# Patient Record
Sex: MALE | Race: WHITE | NOT HISPANIC OR LATINO | Employment: FULL TIME | URBAN - METROPOLITAN AREA
[De-identification: names, ages, dates, MRNs, and addresses within clinical notes are randomized per-mention and may not be internally consistent; named-entity substitution may affect disease eponyms.]

---

## 2019-01-21 ENCOUNTER — OFFICE VISIT - RIVER FALLS (OUTPATIENT)
Dept: FAMILY MEDICINE | Facility: CLINIC | Age: 39
End: 2019-01-21

## 2019-01-21 ASSESSMENT — MIFFLIN-ST. JEOR: SCORE: 2188.6

## 2019-01-22 LAB
BUN SERPL-MCNC: 14 MG/DL (ref 7–25)
BUN/CREAT RATIO - HISTORICAL: NORMAL (ref 6–22)
CALCIUM SERPL-MCNC: 9.7 MG/DL (ref 8.6–10.3)
CHLORIDE BLD-SCNC: 105 MMOL/L (ref 98–110)
CO2 SERPL-SCNC: 28 MMOL/L (ref 20–32)
CREAT SERPL-MCNC: 0.85 MG/DL (ref 0.6–1.35)
EGFRCR SERPLBLD CKD-EPI 2021: 111 ML/MIN/1.73M2
ERYTHROCYTE [DISTWIDTH] IN BLOOD BY AUTOMATED COUNT: 12.5 % (ref 11–15)
GLUCOSE BLD-MCNC: 98 MG/DL (ref 65–99)
HCT VFR BLD AUTO: 42.5 % (ref 38.5–50)
HGB BLD-MCNC: 14.7 GM/DL (ref 13.2–17.1)
MCH RBC QN AUTO: 31.1 PG (ref 27–33)
MCHC RBC AUTO-ENTMCNC: 34.6 GM/DL (ref 32–36)
MCV RBC AUTO: 90 FL (ref 80–100)
PLATELET # BLD AUTO: 377 10*3/UL (ref 140–400)
PMV BLD: 10.8 FL (ref 7.5–12.5)
POTASSIUM BLD-SCNC: 4.5 MMOL/L (ref 3.5–5.3)
RBC # BLD AUTO: 4.72 10*6/UL (ref 4.2–5.8)
SODIUM SERPL-SCNC: 141 MMOL/L (ref 135–146)
TSH SERPL DL<=0.005 MIU/L-ACNC: 1.45 MIU/L (ref 0.4–4.5)
WBC # BLD AUTO: 10.6 10*3/UL (ref 3.8–10.8)

## 2019-02-27 ENCOUNTER — OFFICE VISIT - RIVER FALLS (OUTPATIENT)
Dept: FAMILY MEDICINE | Facility: CLINIC | Age: 39
End: 2019-02-27

## 2019-02-27 ASSESSMENT — MIFFLIN-ST. JEOR: SCORE: 2205.83

## 2019-09-25 ENCOUNTER — OFFICE VISIT - RIVER FALLS (OUTPATIENT)
Dept: FAMILY MEDICINE | Facility: CLINIC | Age: 39
End: 2019-09-25

## 2019-09-25 ASSESSMENT — MIFFLIN-ST. JEOR: SCORE: 2274.78

## 2019-10-31 ENCOUNTER — OFFICE VISIT - RIVER FALLS (OUTPATIENT)
Dept: FAMILY MEDICINE | Facility: CLINIC | Age: 39
End: 2019-10-31

## 2019-10-31 ASSESSMENT — MIFFLIN-ST. JEOR: SCORE: 2297.46

## 2020-02-11 ENCOUNTER — AMBULATORY - RIVER FALLS (OUTPATIENT)
Dept: FAMILY MEDICINE | Facility: CLINIC | Age: 40
End: 2020-02-11

## 2020-02-12 LAB
BUN SERPL-MCNC: 18 MG/DL (ref 7–25)
BUN/CREAT RATIO - HISTORICAL: NORMAL (ref 6–22)
CALCIUM SERPL-MCNC: 9.7 MG/DL (ref 8.6–10.3)
CHLORIDE BLD-SCNC: 105 MMOL/L (ref 98–110)
CHOLEST SERPL-MCNC: 194 MG/DL
CHOLEST/HDLC SERPL: 4.7 {RATIO}
CO2 SERPL-SCNC: 25 MMOL/L (ref 20–32)
CREAT SERPL-MCNC: 0.88 MG/DL (ref 0.6–1.35)
EGFRCR SERPLBLD CKD-EPI 2021: 109 ML/MIN/1.73M2
GLUCOSE BLD-MCNC: 92 MG/DL (ref 65–99)
HDLC SERPL-MCNC: 41 MG/DL
LDLC SERPL CALC-MCNC: 133 MG/DL
NONHDLC SERPL-MCNC: 153 MG/DL
POTASSIUM BLD-SCNC: 4.5 MMOL/L (ref 3.5–5.3)
SODIUM SERPL-SCNC: 140 MMOL/L (ref 135–146)
TRIGL SERPL-MCNC: 97 MG/DL

## 2020-02-17 ENCOUNTER — COMMUNICATION - RIVER FALLS (OUTPATIENT)
Dept: FAMILY MEDICINE | Facility: CLINIC | Age: 40
End: 2020-02-17

## 2020-02-18 ENCOUNTER — OFFICE VISIT - RIVER FALLS (OUTPATIENT)
Dept: FAMILY MEDICINE | Facility: CLINIC | Age: 40
End: 2020-02-18

## 2020-02-18 ASSESSMENT — MIFFLIN-ST. JEOR: SCORE: 2319.23

## 2020-03-03 ENCOUNTER — OFFICE VISIT - RIVER FALLS (OUTPATIENT)
Dept: FAMILY MEDICINE | Facility: CLINIC | Age: 40
End: 2020-03-03

## 2021-05-11 ENCOUNTER — OFFICE VISIT - RIVER FALLS (OUTPATIENT)
Dept: FAMILY MEDICINE | Facility: CLINIC | Age: 41
End: 2021-05-11

## 2022-02-11 VITALS
OXYGEN SATURATION: 94 % | TEMPERATURE: 97.7 F | DIASTOLIC BLOOD PRESSURE: 78 MMHG | SYSTOLIC BLOOD PRESSURE: 128 MMHG | HEART RATE: 110 BPM | WEIGHT: 314 LBS

## 2022-02-12 VITALS
HEART RATE: 103 BPM | SYSTOLIC BLOOD PRESSURE: 118 MMHG | HEIGHT: 69 IN | OXYGEN SATURATION: 97 % | BODY MASS INDEX: 42.63 KG/M2 | WEIGHT: 287.8 LBS | DIASTOLIC BLOOD PRESSURE: 80 MMHG

## 2022-02-12 VITALS
WEIGHT: 308 LBS | HEIGHT: 69 IN | RESPIRATION RATE: 16 BRPM | OXYGEN SATURATION: 96 % | BODY MASS INDEX: 44.88 KG/M2 | HEIGHT: 69 IN | DIASTOLIC BLOOD PRESSURE: 80 MMHG | HEART RATE: 74 BPM | DIASTOLIC BLOOD PRESSURE: 78 MMHG | BODY MASS INDEX: 45.62 KG/M2 | WEIGHT: 303 LBS | SYSTOLIC BLOOD PRESSURE: 126 MMHG | HEART RATE: 72 BPM | SYSTOLIC BLOOD PRESSURE: 122 MMHG | TEMPERATURE: 97.8 F

## 2022-02-12 VITALS
SYSTOLIC BLOOD PRESSURE: 124 MMHG | DIASTOLIC BLOOD PRESSURE: 68 MMHG | WEIGHT: 312.8 LBS | HEIGHT: 69 IN | BODY MASS INDEX: 46.33 KG/M2 | HEART RATE: 74 BPM

## 2022-02-12 VITALS
BODY MASS INDEX: 42.06 KG/M2 | DIASTOLIC BLOOD PRESSURE: 72 MMHG | HEART RATE: 74 BPM | WEIGHT: 284 LBS | SYSTOLIC BLOOD PRESSURE: 124 MMHG | HEIGHT: 69 IN

## 2022-02-16 NOTE — NURSING NOTE
Depression Screening Entered On:  5/11/2021 3:03 PM CDT    Performed On:  5/11/2021 3:03 PM CDT by Hoa Gonzales CMA               Depression Screening   Little Interest - Pleasure in Activities :   More than half the days   Feeling Down, Depressed, Hopeless :   Several days   Initial Depression Screen Score :   3 Score   Poor Appetite or Overeating :   More than half the days   Trouble Falling or Staying Asleep :   More than half the days   Feeling Tired or Little Energy :   More than half the days   Feeling Bad About Yourself :   Not at all   Trouble Concentrating :   Several days   Moving or Speaking Slowly :   Not at all   Thoughts Better Off Dead or Hurting Self :   Not at all   Difficulty at Work, Home, Getting Along :   Somewhat difficult   Detailed Depression Screen Score :   7    Total Depression Screen Score :   10    Hoa Gonzales CMA - 5/11/2021 3:03 PM CDT

## 2022-02-16 NOTE — LETTER
(Inserted Image. Unable to display)     November 15, 2019      ELIF BRITO  [address]  [place], [place] [id number]          Dear ELIF,      Thank you for selecting [facility] (previously [facility]) for your healthcare needs.      Our records indicate you are due for the following services:     Fasting Lab Tests ~ Please do not eat or drink anything 10 hours prior to your scheduled appointment time.  (Water and any medications that you may need are allowed unless directed otherwise.)    If you had your labs done at another facility or with Direct Access Lab Testing at [facility], please bring in a copy of the results to your next visit, mail a copy, or drop off a copy of your results to your Healthcare Provider.      To schedule an appointment or if you have further questions, please contact your primary clinic:   [facility]       [phone]   [facility]       [phone]              [facility]     [phone]      Powered by [employer]    Sincerely,    Dominguez Pedro MD
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
## 2022-02-16 NOTE — PROGRESS NOTES
Patient:   ELIF BRITO            MRN: [id number]            FIN: [id number]               Age:   40 years     Sex:  Male     :  1980   Associated Diagnoses:   Depression; Anxiety; Anxiety   Author:   Dominguez Pedro MD      Chief Complaint   2021 1:58 PM CDT    Medication follow up-Sertraline      History of Present Illness   see chief complaint as noted above and confirmed with the patient     2019    38 year old male here today for follow up with Anxiety and Depression, he was initially seen for this on 2019 and was prescribed Sertraline 50mg tablets. Since starting the medication he has noticed his mood has become more stable, he feels more tenzin and happiness, not so much sadness and feeling upset. He has not had any bad side effects from medication, he has tolerated it well, and would like to continue.      2021  40 year old here for refill.   states medications helped.  he still works more than desired but he is happy.   he struggles with leg kicking during night.  he does snore but often wakes up refreshed.   minimal sleepiness during day  he also notes uncomfortable sensation in legs before sleep and better with movement or massage  at night he does snore and stops breathing occasionally         Review of Systems   Neurologic:  Alert and oriented X4.    Psychiatric:  Anxiety, Depression, No bertha, Not suicidal, No hallucinations.       Health Status   Allergies:    Allergic Reactions (Selected)  Severity Not Documented  No known allergies (No reactions were documented)  Ragweed (No reactions were documented)   Medications:  (Selected)   Prescriptions  Prescribed  sertraline 50 mg oral tablet: = 1 tab(s), Oral, daily, # 30 tab(s), 0 Refill(s), Type: Maintenance, Pharmacy: [facility] [address] [facility], Appt due for further refills, 1 tab(s) Oral daily, 69, in, 20 8:16:00 CST, Height Measured, 312.8, lb, 20 8:16:00 CST, Weight Measured,    Medications          *denotes recorded  medication          sertraline 50 mg oral tablet: 1 tab(s), Oral, daily, 30 tab(s), 0 Refill(s).       Problem list:    All Problems  Morbid obesity / 404722370 / Probable  Restless legs syndrome (RLS) / 34788620 / Confirmed  Moderate tobacco use disorder / 442161702 / Confirmed  Allergic rhinitis / 023336233 / Confirmed  Anxiety with depression / 699428790 / Confirmed  Attention deficit disorder of adult with hyperactivity / 3078057137 / Confirmed  Canceled: Depressive disorder  Canceled: Mild depression / 907111433  Canceled: Anxiety / 27691803  Canceled: Mild depression / 069160854  Canceled: Major Depressive Disorder, Single Episode, Unspecified Degree / 296.20  clinical depression      Histories   Past Medical History:    No active or resolved past medical history items have been selected or recorded.   Family History:    No family history items have been selected or recorded.   Procedure history:    foot surgery.  Comments:  4/27/2016 4:04 PM CDT - Marina Rios  arm surgery.  BA - Barium enema (SNOMED CT 818589151).  facial surgery.  dental surgery.   Social History:        Electronic Cigarette/Vaping Assessment            Electronic Cigarette Use: Use, within last 90 days.      Alcohol Assessment: Denies Alcohol Use      Tobacco Assessment: Current            4 or less cigarettes(less than 1/4 pack)/day in last 30 days, Electronic Cigarettes, 2 per day.  13 year(s).               Previous treatment: Nicotine replacement, Self initiated treatment.  Ready to change: Yes.            Former smoker, quit more than 30 days ago      Substance Abuse Assessment: Denies Substance Abuse      Employment and Education Assessment            Employed, Work/School description: .      Exercise and Physical Activity Assessment: Does not exercise        Physical Examination   Vital Signs   5/11/2021 1:58 PM CDT Temperature Tympanic 97.7 DegF  LOW    Peripheral Pulse Rate 110 bpm  HI    Pulse Site  Radial artery    HR Method Electronic    Systolic Blood Pressure 128 mmHg    Diastolic Blood Pressure 78 mmHg    Mean Arterial Pressure 95 mmHg    BP Site Right arm    BP Method Manual    Oxygen Saturation 94 %      Measurements from flowsheet : Measurements   5/11/2021 1:58 PM CDT    Weight Measured - Standard                314 lb     General:  Alert and oriented, No acute distress.    Eye:  Pupils are equal, round and reactive to light, Normal conjunctiva.    HENT:  Oral mucosa is moist.    Neck:  Supple.    Respiratory:  Respirations are non-labored.    Cardiovascular:  Normal rate, Regular rhythm, No edema.    Gastrointestinal:  Non-distended.    Musculoskeletal:  Normal gait.    Integumentary:  Warm, No rash.    Neurologic:  Alert, Oriented.    Psychiatric:  Cooperative, Appropriate mood & affect, Normal judgment.       Review / Management   Results review      Impression and Plan       Diagnosis     Depression (YXS02-BK F32.9).     Anxiety (HWO60-DV F41.9).     Course:  Improving.    Plan:  will wean off sertraline as it may be cause of restless legs  will try welbutrin SR as replacement    should do home sleep test.    Orders     Orders   Pharmacy:  Wellbutrin  mg/12 hours oral tablet, extended release (Prescribe): = 1 tab(s) ( 150 mg ), Oral, bid, # 180 tab(s), 1 Refill(s), Type: Maintenance, Pharmacy: [facility] [id number], 1 tab(s) Oral bid, 69, in, 2/18/2020 8:16 AM CST, Height Measured, 314, lb, 5/11/2021 1:58 PM CDT, Weight Measured.

## 2022-02-16 NOTE — LETTER
(Inserted Image. Unable to display)   January 22, 2019        ELIF BRITO      [address]  [place], [place] [id number]        Dear ELIF,    Thank you for choosing [facility] for your healthcare needs. Below you will find the results of your recent test(s) done at our clinic.     Your tests show normal electrolytes, normal kidney function, normal thyroid and a good hemoglobin.      Result Name Current Result Reference Range   Sodium Level (mmol/L)  141 1/21/2019 135 - 146   Potassium Level (mmol/L)  4.5 1/21/2019 3.5 - 5.3   Chloride Level (mmol/L)  105 1/21/2019 98 - 110   CO2 Level (mmol/L)  28 1/21/2019 20 - 32   Glucose Level (mg/dL)  98 1/21/2019 65 - 99   BUN (mg/dL)  14 1/21/2019 7 - 25   Creatinine Level (mg/dL)  0.85 1/21/2019 0.60 - 1.35   eGFR (mL/min/1.73m2)  111 1/21/2019 > OR = 60 -    Calcium Level (mg/dL)  9.7 1/21/2019 8.6 - 10.3   TSH (mIU/L)  1.45 1/21/2019 0.40 - 4.50   WBC  10.6 1/21/2019 3.8 - 10.8   RBC  4.72 1/21/2019 4.20 - 5.80   Hgb (gm/dL)  14.7 1/21/2019 13.2 - 17.1   Hct (%)  42.5 1/21/2019 38.5 - 50.0   MCV (fL)  90.0 1/21/2019 80.0 - 100.0   MCH (pg)  31.1 1/21/2019 27.0 - 33.0   MCHC (gm/dL)  34.6 1/21/2019 32.0 - 36.0   RDW (%)  12.5 1/21/2019 11.0 - 15.0   Platelet  377 1/21/2019 140 - 400   MPV (fL)  10.8 1/21/2019 7.5 - 12.5       Please contact me or my assistant at [phone] if you have any questions or concerns.     Sincerely,        Dominguez Pedro MD        What do your labs mean?  Below is a glossary of commonly ordered labs:  LDL   Bad Cholesterol   HDL   Good Cholesterol  AST/ALT   Liver Function   Cr/Creatinine   Kidney Function  Microalbumin   Kidney Function  BUN   Kidney Function  PSA   Prostate    TSH   Thyroid Hormone  HgbA1c   Diabetes Test   Hgb (Hemoglobin)   Red Blood Cells
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
## 2022-02-16 NOTE — TELEPHONE ENCOUNTER
Entered by Mayelin Jacob CMA on May 04, 2021 1:33:55 PM CDT  ---------------------  From: Mayelin Jacob CMA   To: [facility] [id number] [facility]    Sent: 5/4/2021 1:33:55 PM CDT  Subject: Medication Management     ** Submitted: **  Order:sertraline (sertraline 50 mg oral tablet)  1 tab(s)  Oral  daily  Qty:  30 tab(s)        Refills:  0          Substitutions Allowed     Route To Pharmacy - [facility] [address] [facility]    Signed by Mayelin Jacob CMA  5/4/2021 6:33:00 PM [facility]    ** Submitted: **  Complete:sertraline (sertraline 50 mg oral tablet)   Signed by Mayelin Jacob CMA  5/4/2021 6:33:00 PM [facility]    ** Not Approved:  **  sertraline (SERTRALINE HCL 50 MG TABLET)  TAKE 1 TABLET BY MOUTH EVERY DAY  Qty:  90 tab(s)        Days Supply:  90        Refills:  3          Substitutions Allowed     Route To Pharmacy - [facility] [address] [facility]   Signed by Mayelin Jacob CMA            ------------------------------------------  From: [facility] [address] [facility]  To: Dominguez Pedro MD  Sent: May 4, 2021 1:23:13 PM CDT  Subject: Medication Management  Due: April 27, 2021 12:41:43 AM CDT     ** On Hold Pending Signature **     Dispensed Drug: sertraline (sertraline 50 mg oral tablet), TAKE 1 TABLET BY MOUTH EVERY DAY  Quantity: 90 tab(s)  Days Supply: 90  Refills: 3  Substitutions Allowed  Notes from Pharmacy:  ---------------------------------------------------------------  From: Mayelin Jacob CMA (eRx Pool ([id number]_[id number] [facility]))   To: Appointment Pool ([id number]_[place]);     Sent: 5/4/2021 1:34:46 PM CDT  Subject: FW: Medication Management   Due Date/Time: 5/5/2021 1:23:00 PM CDT     Please contact pt to schedule med check with [facility]    Rx filled per protocol  2/18/20 obesity  [email or web address] [id number] ON VMscheduled

## 2022-02-16 NOTE — TELEPHONE ENCOUNTER
---------------------  From: Joanna Pierce MA (eRx Pool (32224_[facility]))   To: [facility] Message Pool (32224_[place] [facility] [place]);     Sent: 2/19/2019 3:56:45 PM CST  Subject: FW: Medication Management   Due Date/Time: 2/17/2019 10:07:00 AM CST     Med Refill  PCP: CHARLENE    Medication: sertraline    Last filled:  1/21/19    Quantity:  30    Date of last office visit and reason:  1/21/19 anxiety/depression    RTC order placed: letter sent and message left for patient to make appt          ** Patient matched by Joanna Pierce MA on 2/19/2019 3:52:39 PM CST **      ------------------------------------------  From: [facility] [id number] [facility]  To: Dominguez Pedro MD  Sent: February 16, 2019 10:07:24 AM CST  Subject: Medication Management  Due: February 17, 2019 10:07:24 AM CST    ** On Hold Pending Signature **  Drug: sertraline (sertraline 50 mg oral tablet)  TAKE 1 TABLET BY MOUTH EVERY DAY  Quantity: 30 tab(s)     Days Supply: 30        Refills: 0  Substitutions Allowed  Notes from Pharmacy:     Dispensed Drug: sertraline (sertraline 50 mg oral tablet)  TAKE 1 TABLET BY MOUTH EVERY DAY  Quantity: 30 tab(s)     Days Supply: 30        Refills: 0  Substitutions Allowed  Notes from Pharmacy:   ---------------------------------------------------------------  From: Charley Grigsby MA ([facility] Message Pool (32224_[facility]))   To: Dominguez Pedro MD;     Sent: 2/19/2019 4:41:44 PM CST  Subject: FW: Medication Management   Due Date/Time: 2/17/2019 10:07:00 AM CST---------------------  From: Dominguez Pedro MD   To: [id number]    Sent: 2/19/2019 4:58:26 PM CST  Subject: FW: Medication Management     ** Submitted: **  Complete:sertraline (sertraline 50 mg oral tablet)   Signed by Dominguez Pedro MD  2/19/2019 4:58:00 PM    ** Approved **  sertraline (SERTRALINE HCL 50 MG TABLET)  TAKE 1 TABLET BY MOUTH EVERY DAY  Qty:  30 tab(s)        Days Supply:  30        Refills:  0          Substitutions Allowed     Route To  Pharmacy - [facility] [id number] [facility]

## 2022-02-16 NOTE — TELEPHONE ENCOUNTER
Entered by Zoe Aranda RN on September 18, 2019 8:50:51 AM CDT  ---------------------  From: Zoe Aranda RN   To: [facility] [id number] [facility]    Sent: 9/18/2019 8:50:51 AM CDT  Subject: Medication Management     ** Not Approved: Request responded to by other means **  sertraline (SERTRALINE HCL 50 MG TABLET)  TAKE 1 TABLET BY MOUTH EVERY DAY  Qty:  90 tab(s)        Days Supply:  90        Refills:  1          Substitutions Allowed     Route To [place] [address] [facility]   Signed by Zoe Aranda RN            ** Submitted: **  Order:sertraline (sertraline 50 mg oral tablet)  1 tab(s)  Oral  daily  Qty:  30 tab(s)        Refills:  0          Substitutions Allowed     Route To [place] [address] [facility]    Signed by Zoe Aranda RN  9/18/2019 8:50:00 AM    ** Submitted: **  Complete:sertraline (sertraline 50 mg oral tablet)   Signed by Zoe Aranda RN  9/18/2019 8:50:00 AM    ** Not Approved:  **  sertraline (SERTRALINE HCL 50 MG TABLET)  TAKE 1 TABLET BY MOUTH EVERY DAY  Qty:  90 tab(s)        Days Supply:  90        Refills:  1          Substitutions Allowed     Route To [place] [address] [facility]   Signed by Zoe Aranda RN            ------------------------------------------  From: [place] [address] [facility]  To: Dominguez Pedro MD  Sent: September 16, 2019 1:47:48 AM CDT  Subject: Medication Management  Due: September 17, 2019 1:47:48 AM CDT    ** On Hold Pending Signature **  Drug: sertraline (sertraline 50 mg oral tablet)  TAKE 1 TABLET BY MOUTH EVERY DAY  Quantity: 90 tab(s)     Days Supply: 90        Refills: 1  Substitutions Allowed  Notes from Pharmacy:     Dispensed Drug: sertraline (sertraline 50 mg oral tablet)  TAKE 1 TABLET BY MOUTH EVERY DAY  Quantity: 90 tab(s)     Days Supply: 90        Refills: 1  Substitutions Allowed  Notes from Pharmacy:     ** On Hold Pending Signature **  Drug: sertraline (sertraline 50 mg oral tablet)  TAKE 1 TABLET BY MOUTH EVERY DAY  Quantity: 90 tab(s)     Days Supply: 90         Refills: 1  Substitutions Allowed  Notes from Pharmacy:     Dispensed Drug: sertraline (sertraline 50 mg oral tablet)  TAKE 1 TABLET BY MOUTH EVERY DAY  Quantity: 90 tab(s)     Days Supply: 90        Refills: 1  Substitutions Allowed  Notes from Pharmacy:   ------------------------------------------Date of last office visit and reason:  2-27-19 Depression/ Anxiety w/MJP      Date of last Med Check / Px:   2-27-19  Date of last labs pertaining to med:  _    RTC order in chart:  Due now for medication follow up    For Protocol refill, has patient been contacted:  Sent 30 day supply per protocol. Called patient and left message requesting he call to schedule appointment.

## 2022-02-16 NOTE — TELEPHONE ENCOUNTER
Entered by Mayelin Jacob CMA on October 21, 2019 7:57:18 PM CDT  ---------------------  From: Mayelin Jacob CMA   To: [facility] [address] [facility]    Sent: 10/21/2019 7:57:18 PM CDT  Subject: Medication Management     ** Submitted: **  Order:sertraline (sertraline 50 mg oral tablet)  1 tab(s)  Oral  daily  Qty:  14 tab(s)        Refills:  0          Substitutions Allowed     Route To Pharmacy - [place] [id number] [facility]    Signed by Mayelin Jacob CMA  10/21/2019 7:56:00 PM    ** Submitted: **  Complete:sertraline (sertraline 50 mg oral tablet)   Signed by Mayelin Jacob CMA  10/21/2019 7:57:00 PM    ** Not Approved:  **  sertraline (SERTRALINE HCL 50 MG TABLET)  TAKE 1 TABLET BY MOUTH EVERY DAY  Qty:  30 tab(s)        Days Supply:  30        Refills:  0          Substitutions Allowed     Route To Pharmacy - [id number]   Signed by Mayelin Jacob CMA            ------------------------------------------  From: [facility] [id number] [facility]  To: Dominguez Pedro MD  Sent: October 21, 2019 6:13:12 PM CDT  Subject: Medication Management  Due: October 22, 2019 6:13:12 PM CDT    ** On Hold Pending Signature **  Drug: sertraline (sertraline 50 mg oral tablet)  TAKE 1 TABLET BY MOUTH EVERY DAY  Quantity: 30 tab(s)  Days Supply: 30  Refills: 0  Substitutions Allowed  Notes from Pharmacy:     Dispensed Drug: sertraline (sertraline 50 mg oral tablet)  TAKE 1 TABLET BY MOUTH EVERY DAY  Quantity: 30 tab(s)  Days Supply: 30  Refills: 0  Substitutions Allowed  Notes from Pharmacy:   ------------------------------------------Med Refill      Date of last office visit and reason:  9/25/19; sinus      Date of last Med Check / Px:   2/27/19; depression/anxiety  Date of last labs pertaining to med:  1/21/19    RTC order in chart:  yes; due    For Protocol refill, has patient been contacted:  msg sent to pharmacy

## 2022-02-16 NOTE — NURSING NOTE
Comprehensive Intake Entered On:  5/11/2021 2:02 PM CDT    Performed On:  5/11/2021 1:58 PM CDT by Hoa Gonzales CMA               Summary   Chief Complaint :   Medication follow up-Sertraline   Weight Measured :   314 lb(Converted to: 314 lb 0 oz, 142.428 kg)    Systolic Blood Pressure :   128 mmHg   Diastolic Blood Pressure :   78 mmHg   Mean Arterial Pressure :   95 mmHg   Peripheral Pulse Rate :   110 bpm (HI)    BP Site :   Right arm   Pulse Site :   Radial artery   BP Method :   Manual   HR Method :   Electronic   Temperature Tympanic :   97.7 DegF(Converted to: 36.5 DegC)  (LOW)    Oxygen Saturation :   94 %   Hoa Gonzales CMA - 5/11/2021 1:58 PM CDT   Health Status   Allergies Verified? :   Yes   Medication History Verified? :   Yes   Medical History Verified? :   Yes   Pre-Visit Planning Status :   Completed   Hoa Gonzales CMA - 5/11/2021 1:58 PM CDT   Consents   Consent for Immunization Exchange :   Consent Granted   Consent for Immunizations to Providers :   Consent Granted   Hoa Gonzales CMA - 5/11/2021 1:58 PM CDT   Meds / Allergies   (As Of: 5/11/2021 2:02:51 PM CDT)   Allergies (Active)   No known allergies  Estimated Onset Date:   Unspecified ; Created By:   Generated Domain User for [id number]; Reaction Status:   Active ; Substance:   No known allergies ; Updated By:   Generated Domain User for [id number]; Source:   Patient ; Reviewed Date:   9/25/2019 5:43 PM CDT      Ragweed  Estimated Onset Date:   Unspecified ; Created By:   Generated Domain User for [id number]; Reaction Status:   Active ; Substance:   Ragweed ; Updated By:   [employer] Domain User for [id number]; Reviewed Date:   9/25/2019 5:43 PM CDT        Medication List   (As Of: 5/11/2021 2:02:51 PM CDT)   Prescription/Discharge Order    sertraline  :   sertraline ; Status:   Prescribed ; Ordered As Mnemonic:   sertraline 50 mg oral tablet ; Simple Display Line:   1 tab(s), Oral, daily, 30 tab(s), 0 Refill(s) ; Ordering Provider:   Mayela COBURN,  Dominguez; Catalog Code:   sertraline ; Order Dt/Tm:   5/4/2021 1:33:32 PM CDT            ID Risk Screen   Recent Travel History :   No recent travel   Family Member Travel History :   No recent travel   Other Exposure to Infectious Disease :   Unknown   COVID-19 Testing Status :   No positive COVID-19 test   Hoa Gonzales CMA - 5/11/2021 1:58 PM CDT   Social History   Social History   (As Of: 5/11/2021 2:02:51 PM CDT)   Alcohol:  Denies Alcohol Use      (Last Updated: 4/27/2016 4:05:58 PM CDT by Marina Rios )         Tobacco:  Current      4 or less cigarettes(less than 1/4 pack)/day in last 30 days, Electronic Cigarettes, 2 per day.  13 year(s).  Previous treatment: Nicotine replacement, Self initiated treatment.  Ready to change: Yes.   (Last Updated: 1/21/2019 5:13:20 PM CST by Dede Crowell CMA)   Former smoker, quit more than 30 days ago   (Last Updated: 5/11/2021 2:00:26 PM CDT by Hoa Gonzales CMA)          Electronic Cigarette/Vaping:        Electronic Cigarette Use: Use, within last 90 days.   (Last Updated: 5/11/2021 2:00:16 PM CDT by Hoa Gonzales CMA)          Substance Abuse:  Denies Substance Abuse      (Last Updated: 4/27/2016 4:06:01 PM CDT by Marina Rios )         Employment/School:        Employed, Work/School description: .   (Last Updated: 4/27/2016 4:03:55 PM CDT by Marina Rios)          Exercise:  Does not exercise      (Last Updated: 4/27/2016 4:06:04 PM CDT by Marina Rios )

## 2022-02-16 NOTE — NURSING NOTE
Comprehensive Intake Entered On:  2/27/2019 10:25 AM CST    Performed On:  2/27/2019 10:00 AM CST by Charley Grigsby MA               Summary   Chief Complaint :   Depression/Anxeity follow up, started on Sertraline on 1/21/2019   Weight Measured :   287.8 lb(Converted to: 287 lb 13 oz, 130.54 kg)    Height Measured :   69 in(Converted to: 5 ft 9 in, 175.26 cm)    Body Mass Index :   42.5 kg/m2 (HI)    Body Surface Area :   2.52 m2   Systolic Blood Pressure :   118 mmHg   Diastolic Blood Pressure :   80 mmHg   Mean Arterial Pressure :   93 mmHg   Peripheral Pulse Rate :   103 bpm (HI)    BP Site :   Right arm   Pulse Site :   Radial artery   Oxygen Saturation :   97 %   Charley Grigsby MA - 2/27/2019 10:00 AM CST   Health Status   Allergies Verified? :   Yes   Medication History Verified? :   Yes   Medical History Verified? :   No   Pre-Visit Planning Status :   Completed   Tobacco Use? :   Current some day smoker   Tobacco Cessation Review :   Other: uses vap   Charley Grigsby MA - 2/27/2019 10:00 AM CST   Consents   Consent for Immunization Exchange :   Consent Granted   Consent for Immunizations to Providers :   Consent Granted   Charley Grigsby MA - 2/27/2019 10:00 AM CST   Meds / Allergies   (As Of: 2/27/2019 10:25:17 AM CST)   Allergies (Active)   No known allergies  Estimated Onset Date:   Unspecified ; Created By:   [employer] Domain User for [id number]; Reaction Status:   Active ; Substance:   No known allergies ; Updated By:   Generated Domain User for [id number]; Source:   Patient ; Reviewed Date:   11/28/2012 12:00 AM CST      Ragweed  Estimated Onset Date:   Unspecified ; Created By:   Generated Domain User for [id number]; Reaction Status:   Active ; Substance:   Ragweed ; Updated By:   Generated Domain User for [id number]; Reviewed Date:   11/16/2013 12:00 AM CST        Medication List   (As Of: 2/27/2019 10:25:17 AM CST)   Prescription/Discharge Order    sertraline 50 mg oral tablet  :   sertraline  50 mg oral tablet ; Status:   Prescribed ; Ordered As Mnemonic:   sertraline 50 mg oral tablet ; Simple Display Line:   1 tab(s), Oral, daily, 30 tab(s) ; Ordering Provider:   Dominguez Pedro MD; Catalog Code:   sertraline ; Order Dt/Tm:   2/19/2019 4:58:25 PM

## 2022-02-16 NOTE — LETTER
(Inserted Image. Unable to display)     February 23, 2021      ELIF BRITO  [address]  [place], [place] [id number]          Dear ELIF,      Thank you for selecting [facility] (previously [facility]) for your healthcare needs.    Our records indicate you are due for the following services:     Follow-up office visit / Medication Check.    (FYI   Regarding office visits: In some instances, a video visit or telephone visit may be offered as an option.)      To schedule an appointment or if you have further questions, please contact your clinic at [phone].      Powered by [employer] and [employer]    Sincerely,    Dominguez Pedro MD

## 2022-02-16 NOTE — LETTER
(Inserted Image. Unable to display)   August 28, 2019        ELIF BRITO  [address]  [place], [place] [id number]        Dear ELIF,      Thank you for selecting [facility] (previously [facility]) for your healthcare needs.    Our records indicate you are due for the following services:     Follow-up office visit     To schedule an appointment or if you have further questions, please contact your primary clinic:   [facility]       [phone]   [facility]       [phone]              [facility]     [phone]      Powered by [email or web address] and [employer]    Sincerely,    Dominguez Pedro MD

## 2022-02-16 NOTE — PROGRESS NOTES
Chief Complaint    Needs forms filled out for insurance  History of Present Illness      39-year-old here requesting we do labs so that he can fill out a insurance form at work.  Says he feels well this needs is filled out.  Physical Exam   Vitals & Measurements    HR: 74(Peripheral)  BP: 126/78     HT: 69 in  WT: 308 lb  BMI: 45.48       Alert and oriented      Normal cognition  Assessment/Plan       Encounter for immunization (Z23)         Given the flu shot       Pre-employment examination (Z02.1)         He wants his labs done and wants some done and available to him today but I made him aware that some of the labs he needs will need to be fasting and he is not fasting at this time after discussion he decided to think about other ways to get this done but I did discuss general health maintenance for someone his age  Patient Information     Name:ELIF BRITO      Address:      [address] [place] [id number]     Sex:Male     YOB: 1980     [phone]     Emergency Contact:CHANTELL BRITO     MRN:[id number]     FIN:[id number]     Location:[facility]     Date of Service:10/31/2019      Primary Care Physician:       Dominguez Pedro MD, [phone]      Attending Physician:       Dominguez Pedro MD, [phone]  Problem List/Past Medical History    Ongoing     Allergic rhinitis     Anxiety     Attention deficit disorder of adult with hyperactivity     Mild depression     Moderate tobacco use disorder     Morbid obesity     Restless legs syndrome (RLS)    Historical     No qualifying data  Procedure/Surgical History     arm surgery     BA - Barium enema     dental surgery     facial surgery     foot surgery      Comments: X4.  Medications    Atrovent 42 mcg/inh nasal spray, 2 spray(s), Nasal, qid, PRN, 2 refills    sertraline 50 mg oral tablet, 1 tab(s), Oral, daily  Allergies    No known allergies    Ragweed  Social History    Smoking Status -  10/31/2019     Current every day smoker     Alcohol - Denies Alcohol Use, 04/27/2016     Employment/School      Employed, Work/School description: ., 04/27/2016     Exercise - Does not exercise, 04/27/2016     Substance Abuse - Denies Substance Abuse, 04/27/2016     Tobacco - Current, 04/27/2016      4 or less cigarettes(less than 1/4 pack)/day in last 30 days, Electronic Cigarettes, 2 per day. 13 year(s). Previous treatment: Nicotine replacement, Self initiated treatment. Ready to change: Yes., 01/21/2019  Immunizations      Vaccine Date Status      influenza virus vaccine, inactivated 10/31/2019 Given      influenza virus vaccine, inactivated 01/21/2019 Given      influenza virus vaccine, inactivated 11/16/2013 Recorded      influenza virus vaccine, inactivated 10/24/2012 Recorded      influenza virus vaccine, inactivated 10/01/2012 Recorded      Td 03/01/2011 Recorded      tetanus/diphth/pertuss (Tdap) adult/adol 03/01/2011 Recorded      tetanus/diphth/pertuss (Tdap) adult/adol 03/21/2007 Recorded      MMR (measles/mumps/rubella) 07/25/1991 Recorded

## 2022-02-16 NOTE — PROGRESS NOTES
Patient:   ELIF BRITO            MRN: [id number]            FIN: [id number]               Age:   38 years     Sex:  Male     :  1980   Associated Diagnoses:   Depression; Anxiety   Author:   Dominguez Pedro MD      Chief Complaint   2019 10:00 AM CST   Depression/Anxeity follow up, started on Sertraline on 2019      History of Present Illness   see chief complaint as noted above and confirmed with the patient     38 year old male here today for follow up with Anxiety and Depression, he was initially seen for this on 2019 and was prescribed Sertraline 50mg tablets. Since starting the medication he has noticed his mood has become more stable, he feels more tenzin and happiness, not so much sadness and feeling upset. He has not had any bad side effects from medication, he has tolerated it well, and would like to continue.        Review of Systems   Neurologic:  Alert and oriented X4.    Psychiatric:  Anxiety, Depression, No bertha, Not suicidal, No hallucinations.       Health Status   Allergies:    Allergic Reactions (Selected)  Severity Not Documented  No known allergies (No reactions were documented)  Ragweed (No reactions were documented)   Medications:  (Selected)   Prescriptions  Prescribed  sertraline 50 mg oral tablet: 1 tab(s), Oral, daily, # 30 tab(s), Type: Soft Stop, Pharmacy: [facility] [id number] [facility]   Problem list:    All Problems  Allergic Rhinitis, Cause Unspecified / ICD-9-.9 / Confirmed  Attention Deficit Disorder of Childhood with Hyperactivity / ICD-9-.01 / Confirmed  Depressive disorder / Confirmed  Morbid Obesity / ICD-9-.01 / Probable  Restless Legs Syndrome (RLS) / ICD-9-.94 / Confirmed  Tobacco Use Disorder / ICD-9-.1 / Confirmed  Canceled: Major Depressive Disorder, Single Episode, Unspecified Degree / ICD-9-.20      Histories   Past Medical History:    No active or resolved past medical history items have been selected or recorded.    Family History:    No family history items have been selected or recorded.   Procedure history:    foot surgery.  Comments:  4/27/2016 4:04 PM CDT - Marina Rios  arm surgery.  BA - Barium enema ([id number]).  facial surgery.  dental surgery.   Social History:        Alcohol Assessment: Denies Alcohol Use      Tobacco Assessment: Current            4 or less cigarettes(less than 1/4 pack)/day in last 30 days, Electronic Cigarettes, 2 per day.  13 year(s).               Previous treatment: Nicotine replacement, Self initiated treatment.  Ready to change: Yes.      Substance Abuse Assessment: Denies Substance Abuse      Employment and Education Assessment            Employed, Work/School description: .      Exercise and Physical Activity Assessment: Does not exercise      Physical Examination   Vital Signs   2/27/2019 10:00 AM CST Peripheral Pulse Rate 103 bpm  HI    Pulse Site Radial artery    Systolic Blood Pressure 118 mmHg    Diastolic Blood Pressure 80 mmHg    Mean Arterial Pressure 93 mmHg    BP Site Right arm    Oxygen Saturation 97 %      Measurements from flowsheet : Measurements   2/27/2019 10:00 AM CST Height Measured - Standard 69 in    Weight Measured - Standard 287.8 lb    BSA 2.52 m2    Body Mass Index 42.5 kg/m2  HI      General:  Alert and oriented, No acute distress.    Eye:  Pupils are equal, round and reactive to light, Normal conjunctiva.    HENT:  Oral mucosa is moist.    Neck:  Supple.    Respiratory:  Respirations are non-labored.    Cardiovascular:  Normal rate, Regular rhythm, No edema.    Gastrointestinal:  Non-distended.    Musculoskeletal:  Normal gait.    Integumentary:  Warm, No rash.    Neurologic:  Alert, Oriented.    Psychiatric:  Cooperative, Appropriate mood & affect, Normal judgment.       Review / Management   Results review:  Lab results   1/21/2019 5:59 PM CST Sodium Level 141 mmol/L    Potassium Level 4.5 mmol/L    Chloride Level 105 mmol/L    CO2 Level  28 mmol/L    Glucose Level 98 mg/dL    BUN 14 mg/dL    Creatinine Level 0.85 mg/dL    BUN/Creat Ratio NOT APPLICABLE    eGFR 111 mL/min/1.73m2    eGFR African American 129 mL/min/1.73m2    Calcium Level 9.7 mg/dL    TSH 1.45 mIU/L    WBC 10.6    RBC 4.72    Hgb 14.7 gm/dL    Hct 42.5 %    MCV 90.0 fL    MCH 31.1 pg    MCHC 34.6 gm/dL    RDW 12.5 %    Platelet 377    MPV 10.8 fL   .       Impression and Plan       Diagnosis     Depression (VDS35-OL F32.9).     Anxiety (XSA24-XO F41.9).     Course:  Improving.    Plan:  He has tolerated medication well, he will continue it at this time.     Encouraged healthy eating habits and a good exercise routine. Discussed importance of sweating to help mood.     Reviewed lab results with patient, they all look great, no abnormalities seen.     Medication refilled for the next six months.     Return in six months for next appointment. .    [employer] Medical Assistant acted solely as a scribe for, and in presence of Dr. Dominguez Pedro who performed the services.

## 2022-02-16 NOTE — NURSING NOTE
Depression Screening Entered On:  2/27/2019 4:52 PM CST    Performed On:  2/27/2019 4:52 PM CST by Charley Grigsby MA               Depression Screening   Feeling Down, Depressed, Hopeless :   Not at all   Little Interest - Pleasure in Activities :   Several days   Initial Depression Screen Score :   1    Trouble Falling or Staying Asleep :   Several days   Feeling Tired or Little Energy :   More than half the days   Poor Appetite or Overeating :   Not at all   Feeling Bad About Yourself :   Not at all   Trouble Concentrating :   Several days   Moving or Speaking Slowly :   Not at all   Thoughts Better Off Dead or Hurting Self :   Not at all   Detailed Depression Screen Score :   4    Total Depression Screen Score :   5    MICHELLE Difficulty with Work, Home, Others :   Not difficult at all   Charley Grigsby MA - 2/27/2019 4:52 PM CST

## 2022-02-16 NOTE — NURSING NOTE
Comprehensive Intake Entered On:  10/31/2019 11:35 AM CDT    Performed On:  10/31/2019 11:32 AM CDT by Dede Crowell CMA               Summary   Chief Complaint :   Needs forms filled out for insurance   Weight Measured :   308 lb(Converted to: 308 lb 0 oz, 139.71 kg)    Height Measured :   69 in(Converted to: 5 ft 9 in, 175.26 cm)    Body Mass Index :   45.48 kg/m2 (HI)    Body Surface Area :   2.61 m2   Systolic Blood Pressure :   126 mmHg   Diastolic Blood Pressure :   78 mmHg   Mean Arterial Pressure :   94 mmHg   Peripheral Pulse Rate :   74 bpm   Dede Crowell CMA - 10/31/2019 11:32 AM CDT   Health Status   Allergies Verified? :   Yes   Medication History Verified? :   Yes   Pre-Visit Planning Status :   Completed   Tobacco Use? :   Current every day smoker   Dede Crowell CMA - 10/31/2019 11:32 AM CDT   Consents   Consent for Immunization Exchange :   Consent Granted   Consent for Immunizations to Providers :   Consent Granted   Dede Crowell CMA - 10/31/2019 11:32 AM CDT   Meds / Allergies   (As Of: 10/31/2019 11:35:00 AM CDT)   Allergies (Active)   No known allergies  Estimated Onset Date:   Unspecified ; Created By:   enavu Domain User for [id number]; Reaction Status:   Active ; Substance:   No known allergies ; Updated By:   enavu Domain User for [id number]; Source:   Patient ; Reviewed Date:   9/25/2019 5:43 PM CDT      Ragweed  Estimated Onset Date:   Unspecified ; Created By:   enavu Domain User for [id number]; Reaction Status:   Active ; Substance:   Ragweed ; Updated By:   enavu Domain User for [id number]; Reviewed Date:   9/25/2019 5:43 PM CDT        Medication List   (As Of: 10/31/2019 11:35:00 AM CDT)   Prescription/Discharge Order    ipratropium nasal  :   ipratropium nasal ; Status:   Prescribed ; Ordered As Mnemonic:   Atrovent 42 mcg/inh nasal spray ; Simple Display Line:   2 spray(s), nasal, qid, PRN: for nasal congestion, 1 EA, 2 Refill(s) ; Ordering Provider:    Chad PAREDES, Cr BARBER; Catalog Code:   ipratropium nasal ; Order Dt/Tm:   9/25/2019 5:56:27 PM CDT          sertraline  :   sertraline ; Status:   Prescribed ; Ordered As Mnemonic:   sertraline 50 mg oral tablet ; Simple Display Line:   1 tab(s), Oral, daily, 14 tab(s), 0 Refill(s) ; Ordering Provider:   Dominguez Pedro MD; Catalog Code:   sertraline ; Order Dt/Tm:   10/21/2019 7:56:50 PM CDT

## 2022-02-16 NOTE — TELEPHONE ENCOUNTER
Entered by Mayelin Jacob CMA on May 28, 2021 1:56:22 PM CDT  ---------------------  From: Mayelin Jacob CMA   To: [facility] [id number] [facility]    Sent: 5/28/2021 1:56:22 PM CDT  Subject: Medication Management     ** Not Approved: Refill not appropriate **  sertraline (SERTRALINE HCL 50 MG TABLET)  TAKE 1 TABLET BY MOUTH EVERY DAY  Qty:  30 tab(s)        Days Supply:  30        Refills:  0          Substitutions Allowed     Route To Pharmacy - [place] [id number] [facility]   Signed by Mayelin Jacob CMA            ------------------------------------------  From: [facility] [id number] [facility]  To: Dominguez Pedro MD  Sent: May 28, 2021 7:32:57 AM CDT  Subject: Medication Management  Due: May 14, 2021 8:11:22 PM CDT     ** On Hold Pending Signature **     Dispensed Drug: sertraline (sertraline 50 mg oral tablet), TAKE 1 TABLET BY MOUTH EVERY DAY  Quantity: 30 tab(s)  Days Supply: 30  Refills: 0  Substitutions Allowed  Notes from Pharmacy:  ------------------------------------------5/11/21 visit: Plan:  will wean off sertraline as it may be cause of restless legs

## 2022-02-16 NOTE — LETTER
(Inserted Image. Unable to display)   February 17, 2020        ELIF BRITO      [address]  [place], [place] [id number]        Dear ELIF,    Thank you for choosing [facility] for your healthcare needs. Below you will find the results of your recent test(s) done at our clinic.      Your tests have stable and OK results.  Your bad cholesterol of 133 is stable and OK.   LDL less than 100 is great,  less than 130 good,  more than 180 is very bad.      Result Name Current Result Previous Result Reference Range   Sodium Level (mmol/L)  140 2/11/2020  141 1/21/2019 135 - 146   Potassium Level (mmol/L)  4.5 2/11/2020  4.5 1/21/2019 3.5 - 5.3   Chloride Level (mmol/L)  105 2/11/2020  105 1/21/2019 98 - 110   CO2 Level (mmol/L)  25 2/11/2020  28 1/21/2019 20 - 32   Glucose Level (mg/dL)  92 2/11/2020  98 1/21/2019 65 - 99   BUN (mg/dL)  18 2/11/2020  14 1/21/2019 7 - 25   Creatinine Level (mg/dL)  0.88 2/11/2020  0.85 1/21/2019 0.60 - 1.35   eGFR (mL/min/1.73m2)  109 2/11/2020  111 1/21/2019 > OR = 60 -    Calcium Level (mg/dL)  9.7 2/11/2020  9.7 1/21/2019 8.6 - 10.3   Cholesterol (mg/dL)  194 2/11/2020   - <200   Non-HDL Cholesterol ((H)) 153 2/11/2020   - <130   HDL (mg/dL)  41 2/11/2020  > OR = 40 -    Cholesterol/HDL Ratio  4.7 2/11/2020   - <5.0   LDL ((H)) 133 2/11/2020     Triglyceride (mg/dL)  97 2/11/2020   - <150       Please contact me or my assistant at [phone] if you have any questions or concerns.     Sincerely,        Dominguez Pedro MD        What do your labs mean?  Below is a glossary of commonly ordered labs:  LDL   Bad Cholesterol   HDL   Good Cholesterol  AST/ALT   Liver Function   Cr/Creatinine   Kidney Function  Microalbumin   Kidney Function  BUN   Kidney Function  PSA   Prostate    TSH   Thyroid Hormone  HgbA1c   Diabetes Test   Hgb (Hemoglobin)   Red Blood Cells

## 2022-02-16 NOTE — LETTER
(Inserted Image. Unable to display)   August 27, 2021    ELIF BRITO  [address]  [place] [address]            Dear ELIF,      Thank you for selecting [facility] for your healthcare needs.    Our records indicate you are due for the following services:     Follow-up office visit / Medication Check.    (FYI   Regarding office visits: In some instances, a video visit or telephone visit may be offered as an option.)      To schedule an appointment or if you have further questions, please contact your clinic at [phone].      Powered by [employer]    Sincerely,    Dominguez Pedro MD

## 2022-02-16 NOTE — TELEPHONE ENCOUNTER
---------------------  From: Marysol Davidson   To: Neela Whitley;     Cc: Dominguez Pedro MD;      Sent: 3/3/2020 9:43:55 AM CST  Subject: Scheduling Management     Patient no showed appointment today.  Appointment was for a consult per CHARLENE.---------------------  From: Neela Whitley   To: Marysol Davidson;     Sent: 3/3/2020 10:27:36 AM CST  Subject: RE: Scheduling Management     noted, thank you

## 2022-02-16 NOTE — NURSING NOTE
Comprehensive Intake Entered On:  9/25/2019 5:45 PM CDT    Performed On:  9/25/2019 5:39 PM CDT by Jaskaran Vazquez CMA               Summary   Chief Complaint :   Pt here for productive cough,fever, sinus pressure and painful upper teeth x 4 days   Weight Measured :   303 lb(Converted to: 303 lb 0 oz, 137.44 kg)    Height Measured :   69 in(Converted to: 5 ft 9 in, 175.26 cm)    Body Mass Index :   44.74 kg/m2 (HI)    Body Surface Area :   2.58 m2   Systolic Blood Pressure :   122 mmHg   Diastolic Blood Pressure :   80 mmHg   Mean Arterial Pressure :   94 mmHg   Peripheral Pulse Rate :   72 bpm   BP Site :   Right arm   Pulse Site :   Radial artery   Temperature Tympanic :   97.8 DegF(Converted to: 36.6 DegC)  (LOW)    Respiratory Rate :   16 br/min   Oxygen Saturation :   96 %   Jaskaran Vazquez CMA - 9/25/2019 5:39 PM CDT   Health Status   Allergies Verified? :   Yes   Medication History Verified? :   No   Medical History Verified? :   Yes   Pre-Visit Planning Status :   Not completed   Tobacco Use? :   Current every day smoker   Jaskaran Vazquez CMA - 9/25/2019 5:39 PM CDT   Meds / Allergies   (As Of: 9/25/2019 5:46:00 PM CDT)   Allergies (Active)   No known allergies  Estimated Onset Date:   Unspecified ; Created By:   [employer] Domain User for [id number]; Reaction Status:   Active ; Substance:   No known allergies ; Updated By:   Generated Domain User for [id number]; Source:   Patient ; Reviewed Date:   9/25/2019 5:43 PM CDT      Ragweed  Estimated Onset Date:   Unspecified ; Created By:   Generated Domain User for [id number]; Reaction Status:   Active ; Substance:   Ragweed ; Updated By:   [employer] Domain User for [id number]; Reviewed Date:   9/25/2019 5:43 PM CDT        Medication List   (As Of: 9/25/2019 5:46:00 PM CDT)   Prescription/Discharge Order    sertraline  :   sertraline ; Status:   Prescribed ; Ordered As Mnemonic:   sertraline 50 mg oral tablet ; Simple Display Line:   1 tab(s), Oral, daily, 30 tab(s), 0 Refill(s) ;  Ordering Provider:   Dominguez Pedro MD; Catalog Code:   sertraline ; Order Dt/Tm:   9/18/2019 8:50:24 AM            Social History   Social History   (As Of: 9/25/2019 5:46:00 PM CDT)   Alcohol:  Denies Alcohol Use      (Last Updated: 4/27/2016 4:05:58 PM CDT by Marina Rios )         Tobacco:  Current      4 or less cigarettes(less than 1/4 pack)/day in last 30 days, Electronic Cigarettes, 2 per day.  13 year(s).  Previous treatment: Nicotine replacement, Self initiated treatment.  Ready to change: Yes.   (Last Updated: 1/21/2019 5:13:20 PM CST by Dede Crowell CMA)          Substance Abuse:  Denies Substance Abuse      (Last Updated: 4/27/2016 4:06:01 PM CDT by Marina Rios )         Employment/School:        Employed, Work/School description: .   (Last Updated: 4/27/2016 4:03:55 PM CDT by Marina Rios)          Exercise:  Does not exercise      (Last Updated: 4/27/2016 4:06:04 PM CDT by Marina Rios )

## 2022-02-16 NOTE — PROGRESS NOTES
Patient:   ELIF BRITO            MRN: [id number]            FIN: [id number]               Age:   38 years     Sex:  Male     :  1980   Associated Diagnoses:   ADHD (attention deficit hyperactivity disorder); Dysthymia; Morbid obesity   Author:   Dominguez Pedro MD      Chief Complaint   2019 5:05 PM CST    Would like to discuss going back on antidepressants      History of Present Illness   see chief complaint as noted above and confirmed with the patient   38 year old patient presents today to discuss symptoms of depression. He has a history of depression and has been on Sertraline in the past with good results. He stopped taking it in  when he couldn't afford it and struggled to remember to take it because he was traveling for work. He is now working in a stable environment and finds he's struggling again. He is struggling to focus on things and lack of motivation on things he normally enjoys      Review of Systems   Constitutional:  No fever, No chills, No fatigue.    Ear/Nose/Mouth/Throat:  No sore throat.    Respiratory:  No shortness of breath, No cough.    Cardiovascular:  No chest pain.    Gastrointestinal:  No nausea, No vomiting.    Musculoskeletal:  No back pain.    Integumentary:  No rash.    Neurologic:  No headache.    Psychiatric:  Depression, Not suicidal, Not delusional.              Health Status   Allergies:    Allergic Reactions (Selected)  Severity Not Documented  No known allergies (No reactions were documented)  Ragweed (No reactions were documented)   Medications:  (Selected)   Prescriptions  Prescribed  sertraline 50 mg oral tablet: 1 tab(s) ( 50 mg ), po, daily, # 90 tab(s), 3 Refill(s), Type: Maintenance, [id number], 1 tab(s) po daily   Problem list:    All Problems  Tobacco Use Disorder / ICD-9-.1 / Confirmed  Restless Legs Syndrome (RLS) / ICD-9-.94 / Confirmed  Morbid Obesity / ICD-9-.01 / Probable  Depressive disorder /  Confirmed  Attention Deficit Disorder of Childhood with Hyperactivity / ICD-9-.01 / Confirmed  Allergic Rhinitis, Cause Unspecified / ICD-9-.9 / Confirmed  Canceled: Major Depressive Disorder, Single Episode, Unspecified Degree / ICD-9-.20      Histories   Past Medical History:    No active or resolved past medical history items have been selected or recorded.   Family History:    No family history items have been selected or recorded.   Procedure history:    foot surgery.  Comments:  4/27/2016 4:04 PM CDT - Marina Rios  arm surgery.  BA - Barium enema ([id number]).  facial surgery.  dental surgery.   Social History:        Alcohol Assessment: Denies Alcohol Use      Tobacco Assessment: Current            4 or less cigarettes(less than 1/4 pack)/day in last 30 days, Electronic Cigarettes, 2 per day.  13 year(s).               Previous treatment: Nicotine replacement, Self initiated treatment.  Ready to change: Yes.      Substance Abuse Assessment: Denies Substance Abuse      Employment and Education Assessment            Employed, Work/School description: .      Exercise and Physical Activity Assessment: Does not exercise      Physical Examination   Vital Signs   1/21/2019 5:05 PM CST Peripheral Pulse Rate 74 bpm    Systolic Blood Pressure 124 mmHg    Diastolic Blood Pressure 72 mmHg    Mean Arterial Pressure 89 mmHg      Measurements from flowsheet : Measurements   1/21/2019 5:05 PM CST Height Measured - Standard 69 in    Weight Measured - Standard 284 lb    BSA 2.5 m2    Body Mass Index 41.93 kg/m2  HI      General:  Alert and oriented, No acute distress.    Eye:  Pupils are equal, round and reactive to light, Normal conjunctiva.    HENT:  Oral mucosa is moist.    Neck:  Supple.    Respiratory:  Respirations are non-labored.    Cardiovascular:  Normal rate, Regular rhythm, No edema.    Gastrointestinal:  Non-distended.    Musculoskeletal:  Normal gait.    Integumentary:   Warm, No rash.    Psychiatric:  Cooperative, Non-suicidal, Patient's PHQ-9 and CAGE questionnaire reviewed and discussed with patient. .         Mood and affect: Depressed.         Judgment: Able to make sensible decisions.         Thought process: Appropriate.       Review / Management   Results review      Impression and Plan   Diagnosis     ADHD (attention deficit hyperactivity disorder) (NLE37-NK F90.9).     Dysthymia (AOF74-XD F34.1).     Morbid obesity (LCL03-LP E66.01).     Plan:  Discussed use/risk/ benefit of antidepressant medication for depression/anxiety.  Reviewed importance of activity, sleep and social support.  Talked about medication side effects including but not limited to nausea, insomnia or somnolence, headache, dry mouth, sexual dysfunction and worsening emotional symptoms including suicidal thoughts.  Medications will take 3-4 weeks to take effect. Will send him to lab for chem 8, cbc, TSH. Will have him return for lipid panel. Will restart him on Sertaline. updated flu shot    Instructed patient to return to clinic in 1 month.   Patient agrees to be seen immediately by provider or go to emergency department if feeling thoughts of self harm.    Referral placed:  _.    I, Nichelle Crowell [facility], acted solely as a scribe for, and in the presence of Dr. Dominguez Pedro who performed the service.      Professional Services   Counseling Summary:  This was a 30 minute visit with greater than 50% of that time spent counseling the patient.    Counseling included treatment options, risks and benefits, lifestyle changes, and medications.    The patient was attentive, and verbalized understanding.

## 2022-02-16 NOTE — NURSING NOTE
Comprehensive Intake Entered On:  2/18/2020 8:20 AM CST    Performed On:  2/18/2020 8:16 AM CST by Dede Crowell CMA               Summary   Chief Complaint :   1. Follow up lab work. 2. Needs refill of sertaline 3. loosing weight   Weight Measured :   312.8 lb(Converted to: 312 lb 13 oz, 141.88 kg)    Height Measured :   69 in(Converted to: 5 ft 9 in, 175.26 cm)    Body Mass Index :   46.19 kg/m2 (HI)    Body Surface Area :   2.63 m2   Systolic Blood Pressure :   124 mmHg   Diastolic Blood Pressure :   68 mmHg   Mean Arterial Pressure :   87 mmHg   Peripheral Pulse Rate :   74 bpm   Dede Crowell CMA - 2/18/2020 8:16 AM CST   Health Status   Allergies Verified? :   Yes   Medication History Verified? :   Yes   Pre-Visit Planning Status :   Completed   Tobacco Use? :   Current every day smoker   Dede Crowell CMA - 2/18/2020 8:16 AM CST   Consents   Consent for Immunization Exchange :   Consent Granted   Consent for Immunizations to Providers :   Consent Granted   Dede Crowell CMA - 2/18/2020 8:16 AM CST   Meds / Allergies   (As Of: 2/18/2020 8:20:55 AM CST)   Allergies (Active)   No known allergies  Estimated Onset Date:   Unspecified ; Created By:   [employer] Domain User for [id number]; Reaction Status:   Active ; Substance:   No known allergies ; Updated By:   Fan[employer] Domain User for [id number]; Source:   Patient ; Reviewed Date:   9/25/2019 5:43 PM CDT      Ragweed  Estimated Onset Date:   Unspecified ; Created By:   Fan[employer] Domain User for [id number]; Reaction Status:   Active ; Substance:   Ragweed ; Updated By:   [employer] Domain User for [id number]; Reviewed Date:   9/25/2019 5:43 PM CDT        Medication List   (As Of: 2/18/2020 8:20:55 AM CST)   Prescription/Discharge Order    ipratropium nasal  :   ipratropium nasal ; Status:   Prescribed ; Ordered As Mnemonic:   Atrovent 42 mcg/inh nasal spray ; Simple Display Line:   2 spray(s), nasal, qid, PRN: for nasal congestion, 1 EA, 2 Refill(s) ;  Ordering Provider:   Cr Bonilla PA-C; Catalog Code:   ipratropium nasal ; Order Dt/Tm:   9/25/2019 5:56:27 PM CDT          sertraline  :   sertraline ; Status:   Prescribed ; Ordered As Mnemonic:   sertraline 50 mg oral tablet ; Simple Display Line:   1 tab(s), Oral, daily, 14 tab(s), 0 Refill(s) ; Ordering Provider:   Dominguez Pedro MD; Catalog Code:   sertraline ; Order Dt/Tm:   10/21/2019 7:56:50 PM CDT

## 2022-02-16 NOTE — NURSING NOTE
CAGE Assessment Entered On:  2/27/2019 4:52 PM CST    Performed On:  2/27/2019 4:51 PM CST by Charley Grigsby MA               Assessment   Have you ever felt you should cut down on your drinking :   No   Have people annoyed you by criticizing your drinking :   No   Have you ever felt bad or guilty about your drinking :   No   Have you ever taken a drink first thing in the morning to steady your nerves or get rid of a hangover (Eye-opener) :   No   CAGE Score :   0    Charley Grigsby MA - 2/27/2019 4:51 PM CST

## 2022-02-16 NOTE — NURSING NOTE
Comprehensive Intake Entered On:  1/21/2019 5:10 PM CST    Performed On:  1/21/2019 5:05 PM CST by Dede Crowell CMA               Summary   Chief Complaint :   Would like to discuss going back on antidepressants   Weight Measured :   284 lb(Converted to: 284 lb 0 oz, 128.82 kg)    Height Measured :   69 in(Converted to: 5 ft 9 in, 175.26 cm)    Body Mass Index :   41.93 kg/m2 (HI)    Body Surface Area :   2.5 m2   Systolic Blood Pressure :   124 mmHg   Diastolic Blood Pressure :   72 mmHg   Mean Arterial Pressure :   89 mmHg   Peripheral Pulse Rate :   74 bpm   Dede Crowell CMA - 1/21/2019 5:05 PM CST   Health Status   Allergies Verified? :   Yes   Medication History Verified? :   Yes   Pre-Visit Planning Status :   Completed   Tobacco Use? :   Light tobacco smoker   Dede Crowell CMA - 1/21/2019 5:05 PM CST   Consents   Consent for Immunization Exchange :   Consent Granted   Consent for Immunizations to Providers :   Consent Granted   Dede Crowell CMA - 1/21/2019 5:05 PM CST   Meds / Allergies   (As Of: 1/21/2019 5:10:18 PM CST)   Allergies (Active)   No known allergies  Estimated Onset Date:   Unspecified ; Created By:   [employer] Domain User for [id number]; Reaction Status:   Active ; Substance:   No known allergies ; Updated By:   [employer] Domain User for [id number]; Source:   Patient ; Reviewed Date:   11/28/2012 12:00 AM CST      Ragweed  Estimated Onset Date:   Unspecified ; Created By:   [employer] Domain User for [id number]; Reaction Status:   Active ; Substance:   Ragweed ; Updated By:   [employer] Domain User for [id number]; Reviewed Date:   11/16/2013 12:00 AM CST        Medication List   (As Of: 1/21/2019 5:10:18 PM CST)   Prescription/Discharge Order    sertraline  :   sertraline ; Status:   Prescribed ; Ordered As Mnemonic:   sertraline 50 mg oral tablet ; Simple Display Line:   50 mg, 1 tab(s), po, daily, 90 tab(s), 3 Refill(s) ; Ordering Provider:   Dominguez Pedro MD; Catalog Code:    sertraline ; Order Dt/Tm:   3/28/2016 4:29:15 PM            Home Meds    acetaminophen  :   acetaminophen ; Status:   Processing ; Ordered As Mnemonic:   Tylenol ; Action Display:   Complete ; Catalog Code:   acetaminophen ; Order Dt/Tm:   1/21/2019 5:08:09 PM          ferrous sulfate  :   ferrous sulfate ; Status:   Processing ; Ordered As Mnemonic:   FEOSOL 325 MG (65 MG ELEMENTAL IRON) ORAL TABLET ([id number]) ; Action Display:   Complete ; Catalog Code:   ferrous sulfate ; Order Dt/Tm:   1/21/2019 5:08:09 PM          ibuprofen  :   ibuprofen ; Status:   Processing ; Ordered As Mnemonic:   ibuprofen ; Action Display:   Complete ; Catalog Code:   ibuprofen ; Order Dt/Tm:   1/21/2019 5:08:09 PM          sertraline  :   sertraline ; Status:   Processing ; Ordered As Mnemonic:   ZOLOFT 50 MG ORAL TABLET ([id number]) ; Simple Display Line:   50 mg, po, 90 tab(s) ; Action Display:   Discontinue ; Catalog Code:   sertraline ; Order Dt/Tm:   1/21/2019 5:08:14 PM

## 2022-02-16 NOTE — LETTER
(Inserted Image. Unable to display)     February 22, 2019      ELIF BRITO  [address]  [place], [place] [id number]          Dear ELIF,      Thank you for selecting [facility] ([facility]viously [facility] for your healthcare needs.    Our records indicate you are due for the following services:    Follow-up office visit / Medication Check.    Fasting Lab Tests ~ Please do not eat or drink anything 10 hours prior to your scheduled appointment time.  (Water and any medications that you may need are allowed unless directed otherwise.)    If you had your labs done at another facility or with Direct Access Lab Testing at [facility], please bring in a copy of the results to your next visit, mail a copy, or drop off a copy of your results to your Healthcare Provider.    You are due for lab work and an office visit; please schedule the lab appointment 1 week before the office visit.  This will assure all results are available to discuss with your provider during your visit.    **It is very helpful if you bring your medication bottles to your appointment.  This assures we have all of your current medications, including strength and dosing information, documented accurately in your medical record.    To schedule an appointment or if you have further questions, please contact your primary clinic:   [facility]       [phone]   [facility]       [phone]              [facility]     [phone]      Powered by [employer] and [employer]    Sincerely,    Dominguez Pedro MD

## 2022-03-21 ENCOUNTER — TELEPHONE (OUTPATIENT)
Dept: FAMILY MEDICINE | Facility: CLINIC | Age: 42
End: 2022-03-21
Payer: COMMERCIAL

## 2022-03-24 ENCOUNTER — OFFICE VISIT (OUTPATIENT)
Dept: FAMILY MEDICINE | Facility: CLINIC | Age: 42
End: 2022-03-24
Payer: COMMERCIAL

## 2022-03-24 VITALS
HEART RATE: 107 BPM | TEMPERATURE: 97.6 F | OXYGEN SATURATION: 96 % | SYSTOLIC BLOOD PRESSURE: 114 MMHG | DIASTOLIC BLOOD PRESSURE: 78 MMHG | WEIGHT: 315 LBS | BODY MASS INDEX: 47.15 KG/M2

## 2022-03-24 DIAGNOSIS — F33.42 RECURRENT MAJOR DEPRESSIVE DISORDER, IN FULL REMISSION (H): ICD-10-CM

## 2022-03-24 DIAGNOSIS — Z98.890 STATUS POST LEFT FOOT SURGERY: ICD-10-CM

## 2022-03-24 DIAGNOSIS — M79.672 LEFT FOOT PAIN: Primary | ICD-10-CM

## 2022-03-24 DIAGNOSIS — E66.01 MORBID OBESITY (H): ICD-10-CM

## 2022-03-24 DIAGNOSIS — R53.81 PHYSICAL DECONDITIONING: ICD-10-CM

## 2022-03-24 PROCEDURE — 99214 OFFICE O/P EST MOD 30 MIN: CPT | Performed by: FAMILY MEDICINE

## 2022-03-24 RX ORDER — BUPROPION HYDROCHLORIDE 150 MG/1
150 TABLET ORAL EVERY MORNING
Qty: 90 TABLET | Refills: 1 | Status: SHIPPED | OUTPATIENT
Start: 2022-03-24 | End: 2023-05-09

## 2022-03-24 RX ORDER — BUPROPION HYDROCHLORIDE 150 MG/1
150 TABLET, EXTENDED RELEASE ORAL 2 TIMES DAILY
COMMUNITY
Start: 2021-05-11 | End: 2022-03-24
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
## 2022-03-29 NOTE — TELEPHONE ENCOUNTER
----- Message from Kassandra Longoria MD sent at 3/29/2022  3:32 PM CDT -----  Team - please call patient with results.  It looks like there is probably a loose screw in his foot.  He soul see the foot surgeon.

## 2022-04-02 ENCOUNTER — HEALTH MAINTENANCE LETTER (OUTPATIENT)
Age: 42
End: 2022-04-02

## 2022-04-04 ENCOUNTER — MYC MEDICAL ADVICE (OUTPATIENT)
Dept: FAMILY MEDICINE | Facility: CLINIC | Age: 42
End: 2022-04-04
Payer: COMMERCIAL

## 2022-04-04 NOTE — LETTER
To whom it may concern:    Due to patient's depression, obesity and diabetes, I am ordering that the patient obtain and begin to work out using strength-training exercise equipment such as a Bowflex or similar machine.  Due to his obesity, treadmills, bikes and other cardio-based equipment may be too hard on his heart at this time.  Free weights are not advised due to lack of a  to assist him to safely exercise with said weights.  His lack of mobility makes working out outside of his home a non-option.  He has benefited in the past with weight lifting.  Patient goals should include weight loss, improvement of depression symptoms,  improved control of glycemic levels, and improved quality of life.    Sincerely,    Dominguez Pedro MD

## 2022-04-04 NOTE — LETTER
To whom it may concern:    Due to health reasons patient would benefit from a regular exercise program.  He is benefited in the past with weight lifting.  Patient goal should include weight loss.    Sincerely,    Dominguez Pedro MD

## 2022-04-04 NOTE — LETTER
Re: Oleg MALHOTRA Thuy  [address]  [place] [address]   1980     To whom it may concern:    Due to patient's depression, obesity and diabetes, I am ordering that the patient obtain and begin to work out using strength-training exercise equipment such as a Bowflex or similar machine.  Due to his obesity, treadmills, bikes and other cardio-based equipment may be too hard on his heart at this time.  Free weights are not advised due to lack of a  to assist him to safely exercise with said weights.  His lack of mobility makes working out outside of his home a non-option.  He has benefited in the past with weight lifting.  Patient goals should include weight loss, improvement of depression symptoms,  improved control of glycemic levels, and improved quality of life.    Sincerely,    Dominguez Pedro MD

## 2022-04-05 NOTE — TELEPHONE ENCOUNTER
See Note below.  Letter may be insufficient for patient to obtain requested equipment.  I can have patient schedule an appointment.  Routing to provider for review.    Sami Cavanaugh LPN on 4/5/2022 at 3:06 PM     From the [employer]    Exercise Equipment: HSA Eligibility  Search Eligible Products    Exercise Equipment: requires a Letter of Medical Necessity (LMN) to be eligible with a Health Savings Account (HSA)  Exercise equipment is eligible for reimbursement with a Letter of Medical Necessity (LMN) with a flexible spending account (FSA), health savings account (HSA) or a health reimbursement arrangement (HRA).     Not eligible for general use and wellbeing.    Research Update: IRS Information Letter re: Home Exercise Equipment (10/21/03) Information Letter 3428-1968    What is exercise equipment?      Exercise equipment are any machines or equipment used for physical exercise. This includes treadmills, weights, and more ([employer]). Exercise equipment must be used to treat a specific medical condition in order to be considered eligible for reimbursement under a consumer-directed health care account. Exercise equipment used for general health and wellbeing is not eligible for reimbursement.    Some specific medical conditions may require exercise equipment, which a doctor can determine and prescribe. Examples of specific medical conditions for which a doctor may prescribe exercise equipment include obesity, diabetes, high blood pressure, and more.     Weight loss plans are not an acceptable reason for reimbursing the cost of exercise equipment.    How do I obtain a Letter of Medical Necessity (LMN)?    A Letter of Medical Necessity (LMN) for exercise equipment is necessary for reimbursement with most benefits providers. This letter must outline how an account munson's needs for help, related to their medical condition, necessitates the use of exercise equipment; how the ergonomic  items will be used to alleviate the issue; and how the ergonomic items will be used. If the treatment plan exceeds the current plan year, another LMN will have to be provided to the  to cover the duration of the treatment.

## 2022-04-05 NOTE — TELEPHONE ENCOUNTER
Requesting letter of medical necessity.  Diagnosis of obesity and diabetes should be sufficient.  Routing to provider for review.    Sami Cavanaugh LPN on 4/5/2022 at 10:50 AM

## 2022-04-06 ENCOUNTER — OFFICE VISIT (OUTPATIENT)
Dept: PODIATRY | Facility: CLINIC | Age: 42
End: 2022-04-06
Payer: COMMERCIAL

## 2022-04-06 VITALS — HEIGHT: 71 IN | HEART RATE: 105 BPM | BODY MASS INDEX: 44.1 KG/M2 | WEIGHT: 315 LBS | OXYGEN SATURATION: 96 %

## 2022-04-06 DIAGNOSIS — T84.213A: Primary | ICD-10-CM

## 2022-04-06 PROBLEM — F41.8 MIXED ANXIETY DEPRESSIVE DISORDER: Status: ACTIVE | Noted: 2022-04-06

## 2022-04-06 PROBLEM — G25.81 RESTLESS LEGS SYNDROME: Status: ACTIVE | Noted: 2022-04-06

## 2022-04-06 PROBLEM — J30.9 ALLERGIC RHINITIS: Status: ACTIVE | Noted: 2022-04-06

## 2022-04-06 PROCEDURE — 99204 OFFICE O/P NEW MOD 45 MIN: CPT | Performed by: PODIATRIST

## 2022-04-06 ASSESSMENT — PAIN SCALES - GENERAL: PAINLEVEL: MILD PAIN (2)

## 2022-04-06 NOTE — TELEPHONE ENCOUNTER
Attempted to contact insurance plan to get a fax number to submit the LMN to but was unable to get the information.  Will try again tomorrow.
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
## 2022-04-06 NOTE — PATIENT INSTRUCTIONS
Oleg,          Procedure:   Painful screw fixation left foot     Procedure Date :    *A surgery nurse will call you 1-2 days before surgery to inform you of the time of arrival for surgery.    Surgeon:   MD Lázaro Cummins    Admission Type:   Outpatient    Procedure Location:   [facility]:  [address] [place] [address] [address] [place] [address] (Fax: [phone])    Covid Test:     Post Operative Appointment:       Preparation Instructions to complete:    []  You will need a Pre-op Physical within 30 days before surgery with your primary care provider. This exam is required by the anesthesiologist to ensure a safe surgical experience.      Failure  to obtain your pre-op physical will lead to cancellation of your surgery    Call them right away to schedule this. Please ensure your Preoperative Physical is faxed to the Hospital (numbers listed above)    [] Preoperative Medication Instructions    We would like you to stop your anticoagulation medications 3-5 days before surgery HOWEVER contact your prescribing provider for instructions before discontinuing any medications. (Examples: Coumadin, Plavix, Xarelto, Eliquis, Pradaxa, Effient, Brilinta) If you are on Coumadin, we would like the goal INR ? 1.2.    IT IS OK TO STAY ON ASPIRIN PRIOR TO SURGERY.     Hold Ibuprofen, Herbal Supplements and Vitamin E 7 days before    Stop Cialis, Levitra and Viagra 2-3 days before surgery    [] Fasting Requirements    Nothing to eat for 8 hours before surgery unless instructed differently by the surgery nurse.    You may have clear liquids up to two hours before your arrival time (coffee, tea, water, or Gatorade. No cream or milk)    If your primary care provider has instructed you to continue oral medications, you may take them on the morning of surgery with a small sip of water.      No alcohol or smoking after 12:00am the day of surgery    [] PCR COVID-19 test is required within 4 days of surgery    If your  "test is positive or you fail to get tested, your surgery will be postponed.     [] Contact your insurance regarding coverage    If you would like a Good Chel Estimate for your upcoming procedure at [facility] Location, contact Cost of Care Estimates     Advocates are available Monday through Friday 8am - 5pm   [phone]    You may also submit a request online at [email or web address]  - Complete the secure online form found under \"Services and Procedure Pricing\"     For all self-pay, estimate, or anesthesia billing questions at [facility], the contact information is below:      Who to contact: Silvia SOOD    [facility] Anesthesia Network number: [phone]    Prepay number: [phone]    [] DO NOT BRING FMLA WITH TO SURGERY.  These should be sent to the provider's office by fax to [phone].    [] Day of Surgery    Medications - Take as indicated with sips of water.     Wear comfortable loose fitting clothes. Wear your glasses-Not contacts. Do not wear jewelry and remove body piercing's. Surgery may be cancelled if they are not removed.     You may have 1 family member wait in the lobby during your surgery. Visitor restrictions are subject to change. Please verify with the surgery nurse when they call.     If same day surgery-Have a someone come with you to surgery that can help you understand the surgeon's instructions, drive you home and stay with you overnight the first night.    [] If the community sees a new COVID-19 surge, your procedure may need to be postponed. We will contact you if this happens.    [] You will receive a call from a surgery nurse 1-3 days prior to surgery. They will go over more details with you.         Before Your Procedure or Hospital Admission  Testing for COVID-19 (Coronavirus)    Thank you for choosing [facility] for your health care needs. The COVID-19 pandemic is a very challenging time for everyone. Our goal is to keep you and our " team here at [facility] safe and healthy.       Before you come in, All patients must get tested for COVID-19. Your test needs to happen 2 to 4 days before you check in to the hospital or surgery site.    Note: If you go to a clinic or pharmacy like [facility] or [facility] for your test, make sure you get a test inside the nose. Tests inside the nose are:  - A naso-pharyngeal (NP) RT-PCR test  - An anterior nares RT-PCR test    Do NOT get a  rapid  test or a saliva (spit) test.    After the test, please stay at home and stay out of contact with other people. It will be harder for you to recover if you get COVID-19 before your treatment.    Please follow all current safety guidelines, including:    Limit trips outside your home.    Limit the number of people you see.    Always wear a mask outside your home.    Use social distancing. Stay 6 feet away from others whenever you can.    Wash your hands often.    If your test shows you have COVID-19  If your test is positive, we ll let you know. A positive test means that you have the virus.  We ll probably have to postpone your admission, surgery or procedure. Your doctor will discuss this with you. After that, we ll let you know what to do and when you can re-schedule.  We may need to cancel your treatment on short notice for other reasons, too.    If your test shows you DON T have COVID-19  Even if your test is negative, you can still get COVID-19. It s rare but, sometimes, the test result is wrong. You could also catch the virus after taking the test.  There s a very small chance that you could catch COVID-19 in the hospital or surgery center.    Day of your surgery or procedure    Please come wearing a face covering that covers both your nose and mouth.    When you arrive, we ll ask you some questions to find out if you have any signs or symptoms of COVID-19.    Ask your care team if you can have visitors. All visitors must wear face coverings and will be screened for  signs of COVID-19.    Even if no visitors are allowed, you can still have with you:  - Your legal guardian or legal decision maker  - A parent and one other visitor, if you are  younger than 18 years old  - A partner and a , if you are in labor    We might need to teach you about taking care of yourself after surgery. If so, a visitor can come into the hospital to learn about it, too.    The rules for visitors change often, depending on how much the virus is spreading. To learn more, see Visiting a Loved One in the Hospital during the COVID-19 Outbreak. Please call your care team, hospital or surgery center if you have any questions. We thank you for your understanding and for choosing [facility] for your care.    Questions and Answers  Does it matter where I get tested for COVID-19?  Yes. We urge you to get tested at one of our [facility] COVID-19 testing sites. We process these tests in our lab and can get the results quickly. Your [facility] care team needs to get your results before you check in.    What should I do if I can t get tested at [facility]?  You can get tested somewhere else, but you ll need to take these extra steps:  1. Contact your family doctor or clinic to arrange your test.  2. Take the test within 4 days of your surgery or procedure. We can t accept tests older than 4 days.  3. Make sure you re getting a test inside the nose.  Tests inside the nose are:  - A naso-pharyngeal (NP) RT-PCR test  - An anterior nares RT-PCR test  -Many pharmacies use  rapid  tests or saliva (spit) tests. We do NOT accept those tests before surgery or procedures. Tests from inside the nose are the most accurate tests.  4. Make sure your doctor or clinic faxes your results to [facility] at [phone].    If we don t get your results in time, we may have to delay or cancel your treatment.      DAISY WILDE [employer], [employer]        Frequently Asked Questions    WHAT DO I DO  WHEN I COME HOME FROM SURGERY?    After surgery and/ or your hospital stay you may be tired and drowsy. Rest, but make sure to get up and walk around every couple of hours to circulate your blood. If you are non-weight bearing do not put any weight on your foot.  Be sure to take your medications as directed. Try to get a restful sleep and begin more normal activities as tolerated.    HOW MUCH PAIN SHOULD I EXPECT AND WILL I HAVE PAIN MEDICATION?    Everyone tolerates pain differently. Still, each type of surgery involves a certain level and type of pain. Generally most people feel better within a few days but keep in mind that everyone has a different tolerance to pain. All medications should be taken as directed. All medications can have side effects such as bleeding, upset stomach, headaches, dizziness, constipation, etc. If you have any major problems or allergic reaction, stop the medication and call the office. If you only have a little pain, you may simply take Tylenol or Ibuprofen as directed on the label.     WHAT ABOUT MY DRESSING AND WHEN DO I COME BACK TO THE OFFICE?    The outer dressing stays in place for 7 days and when you come in for your first post-op we will remove it.  Some patients may have staples, zipper or sutures; these stay in for 10-14 days and will be removed at your 2nd post-op visit. After 24 hours you may shower using shower precautions.    WHAT ABOUT SWELLING, REDNESS, BRUISING OR DRAINAGE?    It is normal to have some swelling, and bruising after surgery. It gradually subsides but may be present for several weeks. Elevation and icing will help to reduce the swelling more rapidly.  If your bandage is really tight after 24 hours you may cut the bandage an inch by the toes or under your foot and by your ankle.  Ice therapy often works better than oral pain medication. Using cold therapy is recommended every hour for 20 minutes.    WHEN CAN I TAKE A BATH?    You can take a bath as long as  the wound has no drainage and is fully healed without a scab. This generally takes about 2 weeks.  Unless you get the bandaged protector from above then you can take a shower when you feel up to it.    WHEN CAN I RETURN TO WORK?    You may return to work to an office-type job whenever you feel comfortable enough. The only restriction would be your own motivation and pain tolerance. If your job requires vigorous activities you may be off 1-4 weeks which is determined by what surgery you have and your operating physician.     WHAT ABOUT DRIVING OR FLYING?    As a general rule, you may resume driving as soon as you are comfortable and fully alert and off narcotic pain medications. If you are traveling by plane within 4 weeks of surgery, perform range of motion exercises of the legs and feet during the flight. Get up and walk around frequently to prevent blood clots.      WHEN CAN I EXERSICE?    You may return to normal activities such as exercising when your surgeon tells you usually 2 weeks. Walking, sitting, standing and going up the stairs are all fine after the 2-week olga lidia. You may have restrictions for 1-4 weeks of no lifting, pushing, pulling in excess of 20 lbs. This is determined by what surgery you have and your surgeon.    WILL I BECOME ADDICTED TO MY PAIN MEDICATION?    Pain medications are safe and effective when used as directed. However, misuse of these products can be extremely harmful and even deadly. Pain medications must be taken only as directed by your surgeon. Do not change the dose of your pain medication without talking to your operating physician first.    POSTOPERATIVE INFORMATION: MANAGING PAIN  Measuring Your Pain    A pain scale helps you rate pain intensity. In the scale, 0 means no pain, and 10 is the worst pain possible.  You should rate your pain every 4-6 hours. You may feel some pain even with medications. It is important to tell your health care provider if medications don't reduce  the pain.        Managing Post-Op Pain at Home: Medications    Pain after an operation (post-op pain) is common and expected. These guidelines can help you stay as comfortable as possible.    Taking Pain Medications    ? Take any prescribed pain medications as directed by your doctor.  ? Take pain medications with some food to avoid an upset stomach.  ? Be aware that narcotic pain medications cause constipation. We recommend taking Milk of Magnesia   ? Eating high-fiber foods and drink plenty of water.  ? Don t drink alcohol while using pain medications.  ? Possible side effects include stomach upset, nausea, and itching.    Alternating Ibuprofen with your pain medication    Ibuprofen has three actions: it reduces fever, relieves pain and fights inflammation. Alternate between your prescribed pain medication and Ibuprofen every three hours (i.e., take a dose of Ibuprofen then three hours later take your prescribed pain medication then three hours later Ibuprofen, ect.) These two medications are safe to use together like this.    POSTOPERATIVE INFORMATION: CONSTIPATION    Constipation after surgery is a common problem and is often related to taking post-operative narcotic pain medication. Signs that you may be constipated include bloating, abdominal distention and/or pain.    Constipation Prevention & Treatment    Drink plenty of fluids! This is the most important thing you can do to help prevent constipation.  Increase physical activity as recommended by your surgeon.  Consume a high fiber diet. We recommend introducing high fiber foods pre-operatively. Some foods high in fiber include:      Oatmeal Bran  Flaxseed Dried Fruit      Carrots   Bananas Strawberries Oranges Whole Grain Bread       Bananas Prunes  Pears  Raspberries     Over the counter medication/supplements - in order of recommended treatment:   (Be sure to follow instructions on product packaging)    ? Fiber supplement     Bulk forming laxative - Can  be used to prevent constipation    Great food sources of fiber include but are not limited to:  ? Colace (docusate sodium)    Osmotic stool softener - Can be used 2-3 times per day to prevent constipation  ? Milk of Magnesia  ? MIRALAX  ? Enema/Suppository    Patient can also  some probiotics such as Culturelle to help prevent Antibiotic associated diarrhea. They can take this 1 hour before or 2 hours after taking their antibiotic should not be taken at the same time as they can cancel out the effects.

## 2022-04-07 ENCOUNTER — TELEPHONE (OUTPATIENT)
Dept: PODIATRY | Facility: CLINIC | Age: 42
End: 2022-04-07
Payer: COMMERCIAL

## 2022-04-07 PROBLEM — T84.213A: Status: ACTIVE | Noted: 2022-04-07

## 2022-04-07 NOTE — TELEPHONE ENCOUNTER
Surgery scheduled with Dr. Cummins at [facility] on 04/22/2022    Email sent to Nik.    Added to [facility].    Pre-op physical scheduled on 04/19/22 .    Schedule COVID test 04/19/22.    Post ops scheduled.

## 2022-04-08 DIAGNOSIS — Z11.59 ENCOUNTER FOR SCREENING FOR OTHER VIRAL DISEASES: Primary | ICD-10-CM

## 2022-04-11 ENCOUNTER — OFFICE VISIT (OUTPATIENT)
Dept: EDUCATION SERVICES | Facility: CLINIC | Age: 42
End: 2022-04-11
Payer: COMMERCIAL

## 2022-04-11 VITALS — WEIGHT: 315 LBS | BODY MASS INDEX: 46.65 KG/M2 | HEIGHT: 69 IN

## 2022-04-11 DIAGNOSIS — E66.01 MORBID OBESITY (H): Primary | ICD-10-CM

## 2022-04-11 PROCEDURE — 97802 MEDICAL NUTRITION INDIV IN: CPT | Performed by: DIETITIAN, REGISTERED

## 2022-04-11 NOTE — PROGRESS NOTES
"Medical Nutrition Therapy  Visit Type:Initial assessment and intervention    Oleg Daley presents today for MNT and education related to weight management.   He is accompanied by self.     ASSESSMENT:   Patient comments/concerns relating to nutrition: Pt would like to work on weight loss.    Pt will be having surgical removal of hardware from his foot and would like to be healthier for surgery and recovery.       NUTRITION HISTORY:  Pt is working on reducing energy drinks (regular).  Works very long hours on the computer and will eat to stay awake, something to do, habit.  Pt does all the cooking and grocery shopping.  Wife is supportive with any changes.      Previous diet education:  No     Food allergies/intolerances: NKFA      EXERCISE: will be getting a bow flex machine this week    SOCIO/ECONOMIC:   Lives with: spouse    MEDICATIONS:  Current Outpatient Medications   Medication     buPROPion (WELLBUTRIN XL) 150 MG 24 hr tablet     No current facility-administered medications for this visit.       LABS:  Last Basic Metabolic Panel:  No results found for: NA   No results found for: POTASSIUM  No results found for: CHLORIDE  No results found for: DYLLAN  No results found for: CO2  No results found for: BUN  No results found for: CR  No results found for: GLC    ANTHROPOMETRICS:  Vitals: Ht 1.753 m (5' 9\")   Wt 144.5 kg (318 lb 8 oz)   BMI 47.03 kg/m    Body mass index is 47.03 kg/m .      Wt Readings from Last 5 Encounters:   04/11/22 144.5 kg (318 lb 8 oz)   04/06/22 147.4 kg (325 lb)   03/24/22 144.8 kg (319 lb 4.8 oz)   05/11/21 142.4 kg (314 lb)   02/18/20 141.9 kg (312 lb 12.8 oz)     ESTIMATED KCAL REQUIREMENTS:  1880 - 2300 kcal per day for weight loss         Nutritional Diagnosis:  Obese, class 3 related to excess energy intake and self- monitoring deficit as evidence by Body mass index is 47.03 kg/m .      Nutritional Interventions:  Nutrition Prescription:  Recommend modified energy- nutrient " intake     Implementation:  Nutrition Education (Content):     Patient to practice goals as stated below    Patient verbalized understanding of diet by stating calorie recommendation    Anticipated compliance: good    Assessed learning needs and learning preferences        Nutrition Education (Application):              Discussed portion sizes, healthy eating, protein and fiber               Determined alternative to emotional eating              Reviewed  beverage choices              Provided: Weight Management Goal Sheet   Discussed portion control + plate planning method; reviewed balanced diet, food groups and incorporating variety, including fruits/vegetables into meals; appropriate calorie intake for weight loss; and physical activity recommendations for weight management.         PATIENT'S BEHAVIOR CHANGE GOALS:   See Patient Instructions for patient stated behavior change goals. AVS was printed and given to patient at today's appointment.     MONITOR / EVALUATE:  RD will monitor/evaluate:  Pertinent Labs  Weight change     Goals:  1880 - 2300 calories/ day  Record intake, weigh self daily   Heart healthy avoid trans fats, limits refined carbohydrates and saturated fats.    *activity - 30- 60 min 5+ days/ week (working up to this as able)      FOLLOW-UP:  3 months     Time spent in minutes: 30  Encounter: Individual

## 2022-04-11 NOTE — PATIENT INSTRUCTIONS
Goals:  Practice healthy stress management (mindful eating, think are you physically hungry or are you board, stressed, emotional etc.) make of list of things to do besides eat.    Try to get good quality sleep with a goal of 7-8 hours per night.  Stay physically active on a daily basis and throughout the year.  [place] Flex   Eat in a healthy way; follow the plate method.  Keep a food record ([employer]; Boxed).  Nutrition reference:  Eat 3 meals a day; snacks are optional.    A meal is 3 or more food groups; make it colorful for better nutrition.      Calorie level  1880 - 2300 calories/ day   Unsweetened vanilla almond milk   Weigh self daily/ weekly     Yovani - for wound healing   ~115 gm protein for wound healing

## 2022-04-11 NOTE — LETTER
"    4/11/2022         RE: Oleg Daley  [address]  [place] [address]        Dear Colleague,    Thank you for referring your patient, Oleg Daley, to the [facility]. Please see a copy of my visit note below.    Medical Nutrition Therapy  Visit Type:Initial assessment and intervention    Oleg Daley presents today for MNT and education related to weight management.   He is accompanied by self.     ASSESSMENT:   Patient comments/concerns relating to nutrition: Pt would like to work on weight loss.    Pt will be having surgical removal of hardware from his foot and would like to be healthier for surgery and recovery.       NUTRITION HISTORY:  Pt is working on reducing energy drinks (regular).  Works very long hours on the computer and will eat to stay awake, something to do, habit.  Pt does all the cooking and grocery shopping.  Wife is supportive with any changes.      Previous diet education:  No     Food allergies/intolerances: NKFA      EXERCISE: will be getting a bow flex machine this week    SOCIO/ECONOMIC:   Lives with: spouse    MEDICATIONS:  Current Outpatient Medications   Medication     buPROPion (WELLBUTRIN XL) 150 MG 24 hr tablet     No current facility-administered medications for this visit.       LABS:  Last Basic Metabolic Panel:  No results found for: NA   No results found for: POTASSIUM  No results found for: CHLORIDE  No results found for: DYLLAN  No results found for: CO2  No results found for: BUN  No results found for: CR  No results found for: GLC    ANTHROPOMETRICS:  Vitals: Ht 1.753 m (5' 9\")   Wt 144.5 kg (318 lb 8 oz)   BMI 47.03 kg/m    Body mass index is 47.03 kg/m .      Wt Readings from Last 5 Encounters:   04/11/22 144.5 kg (318 lb 8 oz)   04/06/22 147.4 kg (325 lb)   03/24/22 144.8 kg (319 lb 4.8 oz)   05/11/21 142.4 kg (314 lb)   02/18/20 141.9 kg (312 lb 12.8 oz)     ESTIMATED KCAL REQUIREMENTS:  1880 - 2300 kcal per day " for weight loss         Nutritional Diagnosis:  Obese, class 3 related to excess energy intake and self- monitoring deficit as evidence by Body mass index is 47.03 kg/m .      Nutritional Interventions:  Nutrition Prescription:  Recommend modified energy- nutrient intake     Implementation:  Nutrition Education (Content):     Patient to practice goals as stated below    Patient verbalized understanding of diet by stating calorie recommendation    Anticipated compliance: good    Assessed learning needs and learning preferences        Nutrition Education (Application):              Discussed portion sizes, healthy eating, protein and fiber               Determined alternative to emotional eating              Reviewed  beverage choices              Provided: Weight Management Goal Sheet   Discussed portion control + plate planning method; reviewed balanced diet, food groups and incorporating variety, including fruits/vegetables into meals; appropriate calorie intake for weight loss; and physical activity recommendations for weight management.         PATIENT'S BEHAVIOR CHANGE GOALS:   See Patient Instructions for patient stated behavior change goals. AVS was printed and given to patient at today's appointment.     MONITOR / EVALUATE:  RD will monitor/evaluate:  Pertinent Labs  Weight change     Goals:  1880 - 2300 calories/ day  Record intake, weigh self daily   Heart healthy avoid trans fats, limits refined carbohydrates and saturated fats.    *activity - 30- 60 min 5+ days/ week (working up to this as able)      FOLLOW-UP:  3 months     Time spent in minutes: 30  Encounter: Individual

## 2022-04-18 NOTE — TELEPHONE ENCOUNTER
Refilled on 3/24/2022 for 90 tabs with 1 refill.   Check on dosing. Last refill was 1 tablet daily

## 2022-04-19 ENCOUNTER — OFFICE VISIT (OUTPATIENT)
Dept: FAMILY MEDICINE | Facility: CLINIC | Age: 42
End: 2022-04-19
Payer: COMMERCIAL

## 2022-04-19 ENCOUNTER — LAB (OUTPATIENT)
Dept: LAB | Facility: CLINIC | Age: 42
End: 2022-04-19

## 2022-04-19 VITALS
DIASTOLIC BLOOD PRESSURE: 72 MMHG | HEIGHT: 70 IN | SYSTOLIC BLOOD PRESSURE: 118 MMHG | HEART RATE: 113 BPM | WEIGHT: 312 LBS | BODY MASS INDEX: 44.67 KG/M2 | OXYGEN SATURATION: 97 %

## 2022-04-19 DIAGNOSIS — M79.672 LEFT FOOT PAIN: Primary | ICD-10-CM

## 2022-04-19 DIAGNOSIS — Z01.818 PREOP GENERAL PHYSICAL EXAM: ICD-10-CM

## 2022-04-19 DIAGNOSIS — Z11.59 ENCOUNTER FOR SCREENING FOR OTHER VIRAL DISEASES: ICD-10-CM

## 2022-04-19 DIAGNOSIS — Z98.890 STATUS POST LEFT FOOT SURGERY: ICD-10-CM

## 2022-04-19 LAB — SARS-COV-2 RNA RESP QL NAA+PROBE: NEGATIVE

## 2022-04-19 PROCEDURE — 99214 OFFICE O/P EST MOD 30 MIN: CPT | Performed by: FAMILY MEDICINE

## 2022-04-19 PROCEDURE — U0005 INFEC AGEN DETEC AMPLI PROBE: HCPCS

## 2022-04-19 PROCEDURE — U0003 INFECTIOUS AGENT DETECTION BY NUCLEIC ACID (DNA OR RNA); SEVERE ACUTE RESPIRATORY SYNDROME CORONAVIRUS 2 (SARS-COV-2) (CORONAVIRUS DISEASE [COVID-19]), AMPLIFIED PROBE TECHNIQUE, MAKING USE OF HIGH THROUGHPUT TECHNOLOGIES AS DESCRIBED BY CMS-2020-01-R: HCPCS
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
## 2022-04-20 RX ORDER — BUPROPION HYDROCHLORIDE 150 MG/1
150 TABLET ORAL 2 TIMES DAILY
Qty: 180 TABLET | Refills: 0 | OUTPATIENT
Start: 2022-04-20

## 2022-04-21 ENCOUNTER — ANESTHESIA EVENT (OUTPATIENT)
Dept: SURGERY | Facility: AMBULATORY SURGERY CENTER | Age: 42
End: 2022-04-21
Payer: COMMERCIAL

## 2022-04-22 ENCOUNTER — ANESTHESIA (OUTPATIENT)
Dept: SURGERY | Facility: AMBULATORY SURGERY CENTER | Age: 42
End: 2022-04-22
Payer: COMMERCIAL

## 2022-04-22 ENCOUNTER — HOSPITAL ENCOUNTER (OUTPATIENT)
Facility: AMBULATORY SURGERY CENTER | Age: 42
Discharge: HOME | End: 2022-04-22
Attending: PODIATRIST
Payer: COMMERCIAL

## 2022-04-22 ENCOUNTER — TELEPHONE (OUTPATIENT)
Dept: FAMILY MEDICINE | Facility: CLINIC | Age: 42
End: 2022-04-22

## 2022-04-22 VITALS
HEIGHT: 69 IN | BODY MASS INDEX: 46.21 KG/M2 | DIASTOLIC BLOOD PRESSURE: 60 MMHG | RESPIRATION RATE: 16 BRPM | HEART RATE: 79 BPM | WEIGHT: 312 LBS | TEMPERATURE: 96.9 F | SYSTOLIC BLOOD PRESSURE: 122 MMHG | OXYGEN SATURATION: 96 %

## 2022-04-22 DIAGNOSIS — T84.213A: ICD-10-CM

## 2022-04-22 DIAGNOSIS — M79.672 LEFT FOOT PAIN: Primary | ICD-10-CM

## 2022-04-22 PROCEDURE — 20680 REMOVAL OF IMPLANT DEEP: CPT | Mod: LT | Performed by: PODIATRIST

## 2022-04-22 RX ORDER — OXYCODONE AND ACETAMINOPHEN 5; 325 MG/1; MG/1
1 TABLET ORAL EVERY 6 HOURS PRN
Qty: 12 TABLET | Refills: 0 | Status: SHIPPED | OUTPATIENT
Start: 2022-04-22 | End: 2022-04-25

## 2022-04-22 RX ORDER — CEPHALEXIN 500 MG/1
500 CAPSULE ORAL 2 TIMES DAILY
Qty: 14 CAPSULE | Refills: 0 | Status: SHIPPED | OUTPATIENT
Start: 2022-04-22 | End: 2022-04-29

## 2022-04-22 RX ORDER — HYDROMORPHONE HCL IN WATER/PF 6 MG/30 ML
0.2 PATIENT CONTROLLED ANALGESIA SYRINGE INTRAVENOUS EVERY 5 MIN PRN
Status: DISCONTINUED | OUTPATIENT
Start: 2022-04-22 | End: 2022-04-23 | Stop reason: HOSPADM

## 2022-04-22 RX ORDER — MAGNESIUM HYDROXIDE 1200 MG/15ML
LIQUID ORAL PRN
Status: DISCONTINUED | OUTPATIENT
Start: 2022-04-22 | End: 2022-04-22 | Stop reason: HOSPADM

## 2022-04-22 RX ORDER — GLYCOPYRROLATE 0.2 MG/ML
INJECTION, SOLUTION INTRAMUSCULAR; INTRAVENOUS PRN
Status: DISCONTINUED | OUTPATIENT
Start: 2022-04-22 | End: 2022-04-22

## 2022-04-22 RX ORDER — HYDROCODONE BITARTRATE AND ACETAMINOPHEN 5; 325 MG/1; MG/1
1-2 TABLET ORAL EVERY 6 HOURS PRN
Qty: 20 TABLET | Refills: 0 | Status: SHIPPED | OUTPATIENT
Start: 2022-04-22 | End: 2022-04-27

## 2022-04-22 RX ORDER — FENTANYL CITRATE 0.05 MG/ML
100 INJECTION, SOLUTION INTRAMUSCULAR; INTRAVENOUS CONTINUOUS PRN
Status: COMPLETED | OUTPATIENT
Start: 2022-04-22 | End: 2022-04-22

## 2022-04-22 RX ORDER — LIDOCAINE HYDROCHLORIDE 20 MG/ML
INJECTION, SOLUTION INFILTRATION; PERINEURAL PRN
Status: DISCONTINUED | OUTPATIENT
Start: 2022-04-22 | End: 2022-04-22

## 2022-04-22 RX ORDER — BUPIVACAINE HYDROCHLORIDE 5 MG/ML
INJECTION, SOLUTION EPIDURAL; INTRACAUDAL
Status: COMPLETED | OUTPATIENT
Start: 2022-04-22 | End: 2022-04-22

## 2022-04-22 RX ORDER — DEXAMETHASONE SODIUM PHOSPHATE 4 MG/ML
INJECTION, SOLUTION INTRA-ARTICULAR; INTRALESIONAL; INTRAMUSCULAR; INTRAVENOUS; SOFT TISSUE PRN
Status: DISCONTINUED | OUTPATIENT
Start: 2022-04-22 | End: 2022-04-22

## 2022-04-22 RX ORDER — KETAMINE HYDROCHLORIDE 50 MG/ML
INJECTION, SOLUTION INTRAMUSCULAR; INTRAVENOUS PRN
Status: DISCONTINUED | OUTPATIENT
Start: 2022-04-22 | End: 2022-04-22

## 2022-04-22 RX ORDER — PROPOFOL 10 MG/ML
INJECTION, EMULSION INTRAVENOUS PRN
Status: DISCONTINUED | OUTPATIENT
Start: 2022-04-22 | End: 2022-04-22

## 2022-04-22 RX ORDER — OXYCODONE HYDROCHLORIDE 5 MG/1
5 TABLET ORAL EVERY 4 HOURS PRN
Status: DISCONTINUED | OUTPATIENT
Start: 2022-04-22 | End: 2022-04-23 | Stop reason: HOSPADM

## 2022-04-22 RX ORDER — ONDANSETRON 2 MG/ML
INJECTION INTRAMUSCULAR; INTRAVENOUS PRN
Status: DISCONTINUED | OUTPATIENT
Start: 2022-04-22 | End: 2022-04-22

## 2022-04-22 RX ORDER — SODIUM CHLORIDE, SODIUM LACTATE, POTASSIUM CHLORIDE, CALCIUM CHLORIDE 600; 310; 30; 20 MG/100ML; MG/100ML; MG/100ML; MG/100ML
INJECTION, SOLUTION INTRAVENOUS CONTINUOUS
Status: DISCONTINUED | OUTPATIENT
Start: 2022-04-22 | End: 2022-04-23 | Stop reason: HOSPADM

## 2022-04-22 RX ORDER — LIDOCAINE 40 MG/G
CREAM TOPICAL
Status: DISCONTINUED | OUTPATIENT
Start: 2022-04-22 | End: 2022-04-23 | Stop reason: HOSPADM

## 2022-04-22 RX ORDER — ONDANSETRON 4 MG/1
4 TABLET, ORALLY DISINTEGRATING ORAL EVERY 30 MIN PRN
Status: DISCONTINUED | OUTPATIENT
Start: 2022-04-22 | End: 2022-04-23 | Stop reason: HOSPADM

## 2022-04-22 RX ORDER — CEFAZOLIN SODIUM IN 0.9 % NACL 3 G/100 ML
3 INTRAVENOUS SOLUTION, PIGGYBACK (ML) INTRAVENOUS
Status: COMPLETED | OUTPATIENT
Start: 2022-04-22 | End: 2022-04-22

## 2022-04-22 RX ORDER — FENTANYL CITRATE 0.05 MG/ML
25 INJECTION, SOLUTION INTRAMUSCULAR; INTRAVENOUS
Status: DISCONTINUED | OUTPATIENT
Start: 2022-04-22 | End: 2022-04-23 | Stop reason: HOSPADM

## 2022-04-22 RX ORDER — ONDANSETRON 2 MG/ML
4 INJECTION INTRAMUSCULAR; INTRAVENOUS EVERY 30 MIN PRN
Status: DISCONTINUED | OUTPATIENT
Start: 2022-04-22 | End: 2022-04-23 | Stop reason: HOSPADM

## 2022-04-22 RX ORDER — FENTANYL CITRATE 0.05 MG/ML
25 INJECTION, SOLUTION INTRAMUSCULAR; INTRAVENOUS EVERY 5 MIN PRN
Status: DISCONTINUED | OUTPATIENT
Start: 2022-04-22 | End: 2022-04-23 | Stop reason: HOSPADM

## 2022-04-22 RX ORDER — PROPOFOL 10 MG/ML
INJECTION, EMULSION INTRAVENOUS CONTINUOUS PRN
Status: DISCONTINUED | OUTPATIENT
Start: 2022-04-22 | End: 2022-04-22

## 2022-04-22 RX ORDER — CEFAZOLIN SODIUM 2 G/100ML
2 INJECTION, SOLUTION INTRAVENOUS
Status: DISCONTINUED | OUTPATIENT
Start: 2022-04-22 | End: 2022-04-23 | Stop reason: HOSPADM

## 2022-04-22 RX ADMIN — BUPIVACAINE HYDROCHLORIDE 10 ML: 5 INJECTION, SOLUTION EPIDURAL; INTRACAUDAL at 10:13

## 2022-04-22 RX ADMIN — KETAMINE HYDROCHLORIDE 20 MG: 50 INJECTION, SOLUTION INTRAMUSCULAR; INTRAVENOUS at 12:03

## 2022-04-22 RX ADMIN — LIDOCAINE HYDROCHLORIDE 40 MG: 20 INJECTION, SOLUTION INFILTRATION; PERINEURAL at 11:54

## 2022-04-22 RX ADMIN — GLYCOPYRROLATE 0.2 MG: 0.2 INJECTION, SOLUTION INTRAMUSCULAR; INTRAVENOUS at 11:54

## 2022-04-22 RX ADMIN — FENTANYL CITRATE 100 MCG: 0.05 INJECTION, SOLUTION INTRAMUSCULAR; INTRAVENOUS at 10:03

## 2022-04-22 RX ADMIN — PROPOFOL 200 MCG/KG/MIN: 10 INJECTION, EMULSION INTRAVENOUS at 11:54

## 2022-04-22 RX ADMIN — ONDANSETRON 4 MG: 2 INJECTION INTRAMUSCULAR; INTRAVENOUS at 12:02

## 2022-04-22 RX ADMIN — SODIUM CHLORIDE, SODIUM LACTATE, POTASSIUM CHLORIDE, CALCIUM CHLORIDE: 600; 310; 30; 20 INJECTION, SOLUTION INTRAVENOUS at 11:37

## 2022-04-22 RX ADMIN — DEXAMETHASONE SODIUM PHOSPHATE 4 MG: 4 INJECTION, SOLUTION INTRA-ARTICULAR; INTRALESIONAL; INTRAMUSCULAR; INTRAVENOUS; SOFT TISSUE at 12:02

## 2022-04-22 RX ADMIN — PROPOFOL 100 MG: 10 INJECTION, EMULSION INTRAVENOUS at 12:07

## 2022-04-22 RX ADMIN — PROPOFOL 40 MG: 10 INJECTION, EMULSION INTRAVENOUS at 11:54

## 2022-04-22 RX ADMIN — BUPIVACAINE HYDROCHLORIDE 20 ML: 5 INJECTION, SOLUTION EPIDURAL; INTRACAUDAL at 10:10

## 2022-04-22 RX ADMIN — Medication 3 G: at 11:50

## 2022-04-22 NOTE — PROGRESS NOTES
Timeout performed: Yes    Time timeout performed: 3-5 minutes    Time procedure began: 3-5 minutes    Block Assessment/Safety: (prior to administration of Versed and Fentanyl)    Nerve block pamphlet: not given    Safety instructions discussed and reviewed: Yes    Vitals and RASS score pre-procedure and every 5 minutes throughout procedure until block completed: All vitals stable    Cardiac Rhythm: Normal sinus     Pain scale used: 0-10 Numeric Pain Rating Scale, with 10 being the worst pain imaginable    Pain prior to procedure: 2/10    Pain during procedure: 0/10    Pain post-procedure: 0/10    Time procedure ended: 3-5 minutes    Patient observed 15 minutes post-procedure: Yes    Signs of local toxicity Signs/Symptoms: No  (CNS symptoms (may not occur): thais-oral numbness/tingling, tinnitus, metallic taste, seizures  CV collapse: rhythm disturbance, hypotension, bradycardia, altered consciousness)

## 2022-04-22 NOTE — DISCHARGE INSTRUCTIONS
If you have any questions or concerns regarding your procedure, please contact Dr. Cummins, his office number is [phone].    Leg, Knee and Foot blocks:  Do not attempt to walk or bear weight on your leg until all numbness has disappeared and full muscle strength has returned.  Protect yourself from falls!  Follow your surgeon's instructions about when you can begin to walk and put any weight on your leg or foot.   Do not stand or walk without assistance or your crutches if your foot or leg still feels numb, heavy or weak.    For All Types of Blocks:  You arm/leg will be weak, numb and difficult for your brain to locate.  It may feel heavy or absent.  This is normal.   Your hand/foot may feel warmer than usual after a nerve block.  This is also normal and will go away as the block wears off.  Depending on the medication administered, your block may last up to 24 hours.  Follow your surgeon's instructions for when you can move your arm or leg, take pain medications, use cold packs and care of your surgical site.    Contact your Surgeon for:  Inadequate pain control after taking your oral pain medications.  Nausea or vomiting at home.  Bleeding or any other concerns about the incision.   Cold and/or discolored fingers or toes.    If there are concerns related to the block you received, contact your Surgeon. They will then contact the Anesthesia team.   If the hematoma (bruise) at the site of the nerve block seems to be getting bigger.  If the site where the block was performed is red, swollen, draining or hot to touch.  If you develop a painful sensation down your arm or leg.   Any concerns about your anesthetic or the nerve block.     Discharge Instructions for Foot Surgery    Arrange to have an adult drive you home after surgery. If you had general anesthesia, it may take a day or more to fully recover. So for at least the next 24 hours:  Do not drive or use machinery or power tools.  Do not drink alcohol.  Do  not make any major decisions.     Diet    Here are some dietary suggestions following surgery:   Start with liquids and light foods (like dry toast, bananas, and applesauce). As you feel up to it, slowly return to your normal diet.  Drink at least 6 to 8 glasses of water or other nonalcoholic fluids a day.  To avoid nausea, eat before taking narcotic pain medicines.     Medicines    It is important to follow these directions:   Take all medicines as instructed.  Take pain medicines on time. Do not wait until the pain is bad before taking your medicines.  Avoid alcohol while on pain medicines.     Activity    These instructions are to help with your recovery:   Sit or lie down when possible. Put a pillow or 2 under your heel to raise your foot above the level of your heart.  Wrap an ice pack or bag of frozen peas in a thin cloth. Place it over your bandaged foot for no longer than 20 minutes. Do this 3 times a day.  You can drive again in 7 days or as instructed by your healthcare provider.  Wear your surgical shoe at all times unless told otherwise by your healthcare provider.  Use crutches or a cane as directed.  Follow your healthcare provider s instructions about putting weight on your foot.     Bandage and cast care    Here are tips to follow:   Do not shower for 48 hours.  When you can shower again, cover the bandage, splint, or cast with a plastic bag to keep it dry.  Don t remove your bandage until your healthcare provider tells you to. If your bandage gets wet or dirty, check with your healthcare provider. You can likely replace it with a clean, dry one.     What to expect    It is normal to have the following:  Bruising and slight swelling of the foot and toes  A small amount of blood on the dressing     Call your healthcare provider     Contact your healthcare provider right away if you have any of the following:   Continuous bleeding through the bandage  Excessive swelling, increased bleeding, or  redness  Fever over 101.5 F (38.6 C) or chills  Pain unrelieved by pain medicines  Foot feels cold to the touch or numb  Increased pain in your leg or foot  Chest pain or shortness of breath    Coping with pain    *Pain after surgery is normal and expected*    If you have pain after surgery, pain medicine will help you feel better. Take it as told, before pain becomes severe. Also, ask your healthcare provider or pharmacist about other ways to control pain. This might be with heat, ice, or relaxation. And follow any other instructions your surgeon or nurse gives you.    Tips for taking pain medicine    To get the best relief possible, remember these points:    Pain medicines can upset your stomach. Taking them with a little food may help.  Most pain relievers taken by mouth need at least 20 to 30 minutes to start to work.  Don't wait till your pain becomes severe before you take your medicine. Try to time your medicine so that you can take it before starting an activity. This might be before you get dressed, go for a walk, or sit down for dinner.  Constipation is a common side effect of pain medicines. You can take medicines such as laxatives (Miralax) or stool softeners to help ease constipation. Drinking lots of fluids and eating foods such as fruits and vegetables that are high in fiber can also help.  Drinking alcohol and taking pain medicine can cause dizziness and slow your breathing. It can even be deadly. Don't drink alcohol while taking pain medicine.  Pain medicine can make you react more slowly to things. Don't drive or run machinery while taking pain medicine.  Your healthcare provider may tell you to take acetaminophen to help ease your pain. Ask him or her how much you are supposed to take each day. Acetaminophen or other pain relievers may interact with your prescription medicines or other over-the-counter (OTC) medicines. Some prescription medicines have acetaminophen and other ingredients. Using  both prescription and OTC acetaminophen for pain can cause you to overdose. Read the labels on your OTC medicines with care. This will help you to clearly know the list of ingredients, how much to take, and any warnings. It may also help you not take too much acetaminophen. If you have questions or don't understand the information, ask your pharmacist or healthcare provider to explain it to you before you take the OTC medicine.    Discharge Instructions: After Your Surgery, regarding Anesthesia    You ve just had surgery. During surgery, you were given medicine called anesthesia to keep you relaxed and free of pain. After surgery, you may have some pain or nausea. This is common. Here are some tips for feeling better and getting well after surgery.    Going home    Your healthcare provider will show you how to take care of yourself when you go home. He or she will also answer your questions. Have an adult family member or friend drive you home. For the first 24 hours after your surgery:    Don't drive or use heavy equipment.  Don't make important decisions or sign legal papers.  Don't drink alcohol.  Have someone stay with you for the next 24 hours. He or she can watch for problems and help keep you safe.  Be sure to go to all follow-up visits with your healthcare provider. And rest after your surgery for as long as your healthcare provider tells you to.    Managing nausea    Some people have an upset stomach after surgery. This is often because of anesthesia, pain, or pain medicine, or the stress of surgery. These tips will help you handle nausea and eat healthy foods as you get better. If you were on a special food plan before surgery, ask your healthcare provider if you should follow it while you get better. These tips may help:    Don't push yourself to eat. Your body will tell you when to eat and how much.  Start off with clear liquids and soup. They are easier to digest.  Next try semi-solid foods, such as  mashed potatoes, applesauce, and gelatin, as you feel ready.  Slowly move to solid foods. Don t eat fatty, rich, or spicy foods at first.  Don't force yourself to have 3 large meals a day. Instead eat smaller amounts more often.  Take pain medicines with a small amount of solid food, such as crackers or toast, to prevent nausea.    If you have obstructive sleep apnea    You were given anesthesia medicine during surgery to keep you comfortable and free of pain. After surgery, you may have more apnea spells because of this medicine and other medicines you were given. The spells may last longer than usual.   At home:    Keep using the continuous positive airway pressure (CPAP) device when you sleep. Unless your healthcare provider tells you not to, use it when you sleep, day or night. CPAP is a common device used to treat obstructive sleep apnea.  Talk with your provider before taking any pain medicine, muscle relaxants, or sedatives. Your provider will tell you about the possible dangers of taking these medicines.

## 2022-04-22 NOTE — OP NOTE
Date of surgery: 4/22/2022    Surgeon: Lázaro Cummins D.P.M.    Preoperative diagnosis: Painful internal orthopedic fixation left foot    Postoperative diagnosis: Same    Procedure: Attempted removal and internal hardware left foot    Anesthesia: General with popliteal block    Hemostasis: Pneumatic ankle tourniquet 250 mmHg    Blood loss: None    Complication: Appropriate instrumentation unknown  for successful    Description: The patient was taken to the operating room and placed on table in supine position.  Under MAC anesthesia with a popliteal block the left lower extremity was prepped and draped in usual aseptic manner.  Follow exsanguination of the left foot with bandage bandage the tourniquet was inflated following procedure was performed.    Attention was directed to the left foot were easily palpable internal screw and plate fixation was noted on the dorsal medial aspect of the left foot.  At this time intraoperatively the patient was moving his extremities frequently so an LMA was performed.  Once the patient was stable 8 dorsal medial incision was made approximately 7.0 cm in length.  Excision was then deepened to bone and the periosteum was reflected medially and laterally exposing 2 plates.  1 plate on the medial aspect of the base of the first metatarsocuneiform joint had 5 screws with a square head.  The universal screw removal set did not have the appropriate instrumentation for this unknown type of screw.  Second plate on the dorsal aspect of the base of the second metatarsal cuneiform joint head the appropriate instrumentation but after several prolonged attempts to remove the screws all attempts were unsuccessful.  Was unable to be performed.  Then the torque amount of torque that was was unable to be applied.  Due to the inappropriate instrumentation available no further attempts were made to remove the hardware.  Deep closure was accomplished utilizing 3-0 Monocryl suture material.  Skin  closure was accomplished utilizing surgical skin staples.  A sterile dressing was applied to the left foot comprising of Betadine ointment, Adaptic, 4 x 4 gauze, 2 inch Minh and Coban.  The tourniquet was then deflated and normal color returned to all the digits of the left foot.  The patient tolerated the procedure well and was transported from the operating room to the recovery room with vital signs stable and neurovascular status intact.

## 2022-04-22 NOTE — ANESTHESIA PROCEDURE NOTES
Airway       Patient location during procedure: OR  Staff -        Anesthesiologist:  Woody Sheppard MD       CRNA: Cate Agustin APRN CRNA       Performed By: CRNAIndications and Patient Condition       Indications for airway management: thais-procedural       Induction type:intravenous       Mask difficulty assessment: 2 - vent by mask + OA or adjuvant +/- NMBA    Final Airway Details       Final airway type: supraglottic airway    Supraglottic Airway Details        Type: LMA       LMA size: 5    Post intubation assessment        Placement verified by: capnometry, equal breath sounds and chest rise        Number of attempts at approach: 1       Secured with: silk tape       Ease of procedure: easy       Dentition: Intact and Unchanged

## 2022-04-22 NOTE — ANESTHESIA PROCEDURE NOTES
Adductor canal Procedure Note    Pre-Procedure   Staff -        Anesthesiologist:  Woody Sheppard MD       Performed By: anesthesiologist       Location: pre-op       Procedure Start/Stop Times: 4/22/2022 10:13 AM and 4/22/2022 10:15 AM       Pre-Anesthestic Checklist: patient identified, IV checked, site marked, risks and benefits discussed, informed consent, monitors and equipment checked, pre-op evaluation, at physician/surgeon's request and post-op pain management  Timeout:       Correct Patient: Yes        Correct Procedure: Yes        Correct Site: Yes        Correct Position: Yes        Correct Laterality: Yes        Site Marked: Yes  Procedure Documentation  Procedure: Adductor canal       Laterality: left       Patient Position: supine       Patient Prep/Sterile Barriers: sterile gloves, mask       Skin prep: Chloraprep       Needle Type: short bevel       Needle Gauge: 21.        Needle Length (Inches): 4        Ultrasound guided       1. Ultrasound was used to identify targeted nerve, plexus, vascular marker, or fascial plane and place a needle adjacent to it in real-time.       2. Ultrasound was used to visualize the spread of anesthetic in close proximity to the above referenced structure.       3. A permanent image is entered into the patient's record.       4. The visualized anatomic structures appeared normal.       5. There were no apparent abnormal pathologic findings.    Assessment/Narrative         The placement was negative for: blood aspirated, painful injection and site bleeding       Paresthesias: No.       Bolus given via needle..        Secured via.        Insertion/Infusion Method: Single Shot       Complications: none       Injection made incrementally with aspirations every 5 mL.    Medication(s) Administered   Bupivacaine 0.5% PF (Infiltration) - Infiltration   10 mL - 4/22/2022 10:13:00 AM  Medication Administration Time: 4/22/2022 10:13 AM

## 2022-04-22 NOTE — TELEPHONE ENCOUNTER
Reason for Call:  Medication or medication refill:    Do you use a [facility]?  Name of the pharmacy and phone number for the current request:  [facility]    Name of the medication requested: Patient is requesting Percoset as nothing else relives pain.  Please call back to notify.    Other request: Patient is post op surgery 1 hour ago 4.22.22.    Can we leave a detailed message on this number? YES    Phone number patient can be reached at: Cell number on file:    Telephone Information:   Mobile [phone]       Best Time: any    Call taken on 4/22/2022 at 2:31 PM by CASIMIRO POWELL

## 2022-04-22 NOTE — ANESTHESIA POSTPROCEDURE EVALUATION
Patient: Oleg Daley    Procedure: Procedure(s):  ATTEMPTED , HARDWARE REMOVAL LOWER EXTREMITY       Anesthesia Type:  General    Note:  Disposition: Outpatient   Postop Pain Control: Uneventful            Sign Out: Well controlled pain   PONV: No   Neuro/Psych: Uneventful            Sign Out: Acceptable/Baseline neuro status   Airway/Respiratory: Uneventful            Sign Out: Acceptable/Baseline resp. status   CV/Hemodynamics: Uneventful            Sign Out: Acceptable CV status; No obvious hypovolemia; No obvious fluid overload   Other NRE: NONE   DID A NON-ROUTINE EVENT OCCUR? No           Last vitals:  Vitals Value Taken Time   /60 04/22/22 1331   Temp 96.9  F (36.1  C) 04/22/22 1304   Pulse 80 04/22/22 1334   Resp 16 04/22/22 1309   SpO2 97 % 04/22/22 1334   Vitals shown include unvalidated device data.    Electronically Signed By: Woody Sheppard MD  April 22, 2022  1:37 PM

## 2022-04-22 NOTE — ANESTHESIA PREPROCEDURE EVALUATION
Anesthesia Pre-Procedure Evaluation    Patient: Oleg Daley   MRN: [id number] : 1980        Procedure : Procedure(s):  REMOVAL, HARDWARE, LOWER EXTREMITY          Past Medical History:   Diagnosis Date     Arthritis      Depressive disorder      History of blood transfusion      Other chronic pain       Past Surgical History:   Procedure Laterality Date     APPENDECTOMY       COSMETIC SURGERY       FOOT SURGERY Left      ORTHOPEDIC SURGERY        No Known Allergies   Social History     Tobacco Use     Smoking status: Former Smoker     Packs/day: 1.00     Years: 10.00     Pack years: 10.00     Types: Cigarettes     Start date: 1996     Quit date: 2020     Years since quittin.3     Smokeless tobacco: Former User   Substance Use Topics     Alcohol use: Not Currently      Wt Readings from Last 1 Encounters:   22 141.5 kg (312 lb)        Anesthesia Evaluation   Pt has had prior anesthetic.         ROS/MED HX  ENT/Pulmonary:  - neg pulmonary ROS     Neurologic:  - neg neurologic ROS     Cardiovascular:  - neg cardiovascular ROS  (-) murmur and wheezes   METS/Exercise Tolerance: >4 METS    Hematologic:  - neg hematologic  ROS     Musculoskeletal:       GI/Hepatic:  - neg GI/hepatic ROS     Renal/Genitourinary:  - neg Renal ROS     Endo:     (+) Obesity,     Psychiatric/Substance Use:  - neg psychiatric ROS     Infectious Disease:  - neg infectious disease ROS     Malignancy:  - neg malignancy ROS     Other:  - neg other ROS    (+) , H/O Chronic Pain,        Physical Exam    Airway  airway exam normal      Mallampati: II   TM distance: > 3 FB   Neck ROM: full   Mouth opening: > 3 cm    Respiratory Devices and Support         Dental  no notable dental history         Cardiovascular          Rhythm and rate: regular and normal (-) no murmur    Pulmonary           breath sounds clear to auscultation   (-) no wheezes        OUTSIDE LABS:  CBC: No results found for: WBC, HGB, HCT,  PLT  BMP: No results found for: NA, POTASSIUM, CHLORIDE, CO2, BUN, CR, GLC  COAGS: No results found for: PTT, INR, FIBR  POC: No results found for: BGM, HCG, HCGS  HEPATIC: No results found for: ALBUMIN, PROTTOTAL, ALT, AST, GGT, ALKPHOS, BILITOTAL, BILIDIRECT, ERIK  OTHER: No results found for: PH, LACT, A1C, DYLLAN, PHOS, MAG, LIPASE, AMYLASE, TSH, T4, T3, CRP, SED    Anesthesia Plan    ASA Status:  3   NPO Status:  NPO Appropriate    Anesthesia Type: General.     - Airway: Mask Only   Induction: Intravenous, Propofol.   Maintenance: TIVA.        Consents    Anesthesia Plan(s) and associated risks, benefits, and realistic alternatives discussed. Questions answered and patient/representative(s) expressed understanding.     - Discussed: Risks, Benefits and Alternatives for BOTH SEDATION and the PROCEDURE were discussed     - Discussed with:  Patient      - Patient is DNR/DNI Status: No         Postoperative Care    Pain management: IV analgesics, Oral pain medications, Multi-modal analgesia.   PONV prophylaxis: Ondansetron (or other 5HT-3), Dexamethasone or Solumedrol     Comments:    Other Comments: TIVA with Propofol  Zofran for PONV    Placed LMA to secure airway given airway obstruction    Plan for adductor canal block and popliteal block in pre op area for postoperative pain. I discussed the risks and benefits of this plan with the patient. All of the patient's questions were sufficiently answered and he/she is agreeable to this plan for surgery.            Woody Sheppard MD

## 2022-04-22 NOTE — ANESTHESIA CARE TRANSFER NOTE
Patient: Oleg Daley    Procedure: Procedure(s):  ATTEMPTED , HARDWARE REMOVAL LOWER EXTREMITY       Diagnosis: Mechanical breakdown of internal fixation device of bone of foot, initial encounter (H) [T84.213A]  Diagnosis Additional Information: No value filed.    Anesthesia Type:   General     Note:    Oropharynx: oropharynx clear of all foreign objects  Level of Consciousness: awake  Oxygen Supplementation: room air      Dentition unchanged: some bleeding notice on bottom gum line.  Dentition baseline is poor.  Vital Signs Stable: post-procedure vital signs reviewed and stable  Report to RN Given: handoff report given  Patient transferred to: Phase II    Handoff Report: Identifed the Patient, Identified the Reponsible Provider, Reviewed the pertinent medical history, Discussed the surgical course, Reviewed Intra-OP anesthesia mangement and issues during anesthesia, Set expectations for post-procedure period and Allowed opportunity for questions and acknowledgement of understanding      Vitals:  Vitals Value Taken Time   /75 04/22/22 1304   Temp 96.9  F (36.1  C) 04/22/22 1304   Pulse 95 04/22/22 1304   Resp 16 04/22/22 1304   SpO2 95 % 04/22/22 1304   Vitals shown include unvalidated device data.    Electronically Signed By: ADRIANA Gunn CRNA  April 22, 2022  1:08 PM

## 2022-04-22 NOTE — INTERVAL H&P NOTE
"I have reviewed the surgical (or preoperative) H&P that is linked to this encounter, and examined the patient. There are no significant changes    Clinical Conditions Present on Arrival:  Clinically Significant Risk Factors Present on Admission                   # Severe Obesity: Estimated body mass index is 46.07 kg/m  as calculated from the following:    Height as of this encounter: 1.753 m (5' 9\").    Weight as of this encounter: 141.5 kg (312 lb).       "

## 2022-04-22 NOTE — ANESTHESIA PROCEDURE NOTES
Sciatic Procedure Note    Pre-Procedure   Staff -        Anesthesiologist:  Woody Sheppard MD       Performed By: anesthesiologist       Location: pre-op       Procedure Start/Stop Times: 4/22/2022 10:10 AM and 4/22/2022 10:12 AM       Pre-Anesthestic Checklist: patient identified, IV checked, site marked, risks and benefits discussed, informed consent, monitors and equipment checked, pre-op evaluation, at physician/surgeon's request and post-op pain management  Timeout:       Correct Patient: Yes        Correct Procedure: Yes        Correct Site: Yes        Correct Position: Yes        Correct Laterality: Yes        Site Marked: Yes  Procedure Documentation  Procedure: Sciatic       Laterality: left       Patient Position: supine       Patient Prep/Sterile Barriers: sterile gloves, mask       Skin prep: Chloraprep (popliteal approach).       Needle Type: short bevel       Needle Gauge: 21.        Needle Length (Inches): 4        Ultrasound guided       1. Ultrasound was used to identify targeted nerve, plexus, vascular marker, or fascial plane and place a needle adjacent to it in real-time.       2. Ultrasound was used to visualize the spread of anesthetic in close proximity to the above referenced structure.       3. A permanent image is entered into the patient's record.       4. The visualized anatomic structures appeared normal.       5. There were no apparent abnormal pathologic findings.    Assessment/Narrative         The placement was negative for: blood aspirated, painful injection and site bleeding       Paresthesias: No.       Bolus given via needle..        Secured via.        Insertion/Infusion Method: Single Shot       Complications: none       Injection made incrementally with aspirations every 5 mL.    Medication(s) Administered   Bupivacaine 0.5% PF (Infiltration) - Infiltration   20 mL - 4/22/2022 10:10:00 AM  Medication Administration Time: 4/22/2022 10:10 AM

## 2022-04-22 NOTE — TELEPHONE ENCOUNTER
Please let pt know I have sent in a one-time prescription for 3-day supply of Percocet.  Hopefully this will see him through the weekend until he connect with his surgeon.  He really should be reaching out to them to control his postop pain.  However I do understand that with the weekend it might be difficult to get in touch with them.  I hope he is feeling better soon

## 2022-04-27 ENCOUNTER — OFFICE VISIT (OUTPATIENT)
Dept: PODIATRY | Facility: CLINIC | Age: 42
End: 2022-04-27
Payer: COMMERCIAL

## 2022-04-27 VITALS — BODY MASS INDEX: 44.1 KG/M2 | HEART RATE: 105 BPM | WEIGHT: 315 LBS | OXYGEN SATURATION: 97 % | HEIGHT: 71 IN

## 2022-04-27 DIAGNOSIS — T84.213A: Primary | ICD-10-CM

## 2022-04-27 PROCEDURE — 99024 POSTOP FOLLOW-UP VISIT: CPT | Performed by: PODIATRIST

## 2022-04-27 ASSESSMENT — PAIN SCALES - GENERAL: PAINLEVEL: NO PAIN (0)

## 2022-04-27 NOTE — PROGRESS NOTES
Subjective findings: The patient return to the clinic today for postop visit #1, 1 week status post unsuccessful attempt to remove internal hardware left foot.  The patient is in good spirits.  He had no complaints.    Objective findings: The dressings were removed and wound margins well coaptated and maintained.  There is no edema, erythema, cellulitis, drainage or bleeding noted.  Neurovascular status is intact.  Vital signs stable.    Assessment: Painful internal orthopedic fixation left foot    Plan: The patient was instructed to return to the clinic in 1 week for postop visit #2 at which time the sutures will be removed.

## 2022-04-27 NOTE — LETTER
4/27/2022         RE: Oleg Daley  [address]  [place] [address]        Dear Colleague,    Thank you for referring your patient, Oleg Daley, to the [facility]. Please see a copy of my visit note below.    Subjective findings: The patient return to the clinic today for postop visit #1, 1 week status post unsuccessful attempt to remove internal hardware left foot.  The patient is in good spirits.  He had no complaints.    Objective findings: The dressings were removed and wound margins well coaptated and maintained.  There is no edema, erythema, cellulitis, drainage or bleeding noted.  Neurovascular status is intact.  Vital signs stable.    Assessment: Painful internal orthopedic fixation left foot    Plan: The patient was instructed to return to the clinic in 1 week for postop visit #2 at which time the sutures will be removed.      Again, thank you for allowing me to participate in the care of your patient.        Sincerely,        Lázaro Cummins DPM

## 2022-04-27 NOTE — PATIENT INSTRUCTIONS
Physicians  MEGHANN Sandoval MD, Mb, [facility]  Home   Physicians   MEGHANN Sandoval MD, Mb, [facility]    Orthopedic Surgery, Board Certified  Foot & Ankle Surgery Fellowship    Appointment Info  [phone]  Request An Appointment    Locations  [facility]  Foot & Ankle Care  Sports Medicine  Trauma Care  Practice Overview  A board certified orthopedic surgeon, MEGHANN Sandoval earned his medical degree from the [employer] in [place].  He completed his orthopedic residency at the [facility], located in [facility], his trauma fellowship in [facility] and a foot and ankle fellowship at the [facility] and the [facility], in [place], [place], [facility].    Dr. Sandoval sees patients at the [facility] facility. He serves as a fellowship  for the [facility] Fellowship program. He currently serves as Immediate Past President on the board for the American Orthopedic Foot & Ankle Society (AOFAS). He is an  of the Foot and Ankle International Journal and also a member of the CRISTY Editorial Board, as well as reviewer to the Journal of Bone and Joint Surgery and several other orthopedic journals.  He was the  of the AOFAS Education committee from 2011 to 2013, overseeing all the educational activities of the American Orthopedic Foot and Ankle Society. In 2014, he was the  of the AOFAS Specialty day at the AAOS meeting as well as the annual summer meeting of the AOFAS.  He is a member of the AAOS CME committee ensuring ongoing education for all orthopedic surgeons. He also served on the Instructional Course committee of the AAOS from 2014 - 2016. Dr. Sandoval is also one of the Official Team Physicians for the [facility] and travels with the team.    His special orthopedic interests are foot and ankle reconstruction, trauma injuries and sports medicine.  He is  a world-renowned surgeon who has been asked to lecture and teach in more than 20 countries on all continents.  He is a published author in numerous peer-reviewed journals on such topics as total ankle arthroplasties, talar osteochondral defects, lisfranc injuries and more. Current research interests include total ankle arthroplasties, ankle fusions and subtalar fusions.  Dr. Sandoval was named one of the top 23 Foot and Ankle surgeons in the [place] by Orthopedics Today in 2012 and 2013, has been nominated as a  Top Doctor  by the [facility] [place] in 2013 and every year since (2014, 2015, 2016, 2017, 2018, 2019) and in 2016 was selected by Michaela Canas as Top Doctor.    [employer]  Get Back to the Game  Read Article    [place] Athlete s Return to Game  Highly Unlikely  Under One Scenario  Read Article    Subtalar Arthrodesis with Adipose-Derived Cellular Bone Matrix had Lower Fusion Rate vs Autograft  Read Article    Clinical Interest  Ankle Care, including Ankle Joint Revision and Ankle Joint Replacement    Foot Care    Sports Medicine    Trauma Care, including Foot, Ankle, and Lower Leg Only    Dr. Sandoval does not see patients for Plantar Fasciitis, Tarsal Tunnel or Chronic Nerve Pain    Education  Certification  Board Certified, American Board of Orthopaedic Surgery    Fellowship  Fellowship in Trauma in [facility] [place]  Fellowship in Foot & Ankle Surgery at the [employer] and the [facility] [place], [place]    [facility]    Medical Degree  [employer]    Hospital & Surgery Center Affiliations  [facility]opedic Surgery [facility]    Professional Affiliations

## 2022-05-04 ENCOUNTER — OFFICE VISIT (OUTPATIENT)
Dept: PODIATRY | Facility: CLINIC | Age: 42
End: 2022-05-04
Payer: COMMERCIAL

## 2022-05-04 VITALS — BODY MASS INDEX: 44.1 KG/M2 | OXYGEN SATURATION: 97 % | WEIGHT: 315 LBS | HEIGHT: 71 IN | HEART RATE: 97 BPM

## 2022-05-04 DIAGNOSIS — T84.213A: Primary | ICD-10-CM

## 2022-05-04 PROCEDURE — 99024 POSTOP FOLLOW-UP VISIT: CPT | Performed by: PODIATRIST

## 2022-05-04 ASSESSMENT — PAIN SCALES - GENERAL: PAINLEVEL: NO PAIN (0)

## 2022-05-04 NOTE — LETTER
5/4/2022         RE: Oleg Daley  [address]  [place] [address]        Dear Colleague,    Thank you for referring your patient, Oleg Daley, to the [facility]. Please see a copy of my visit note below.    Subjective findings: The patient return to the clinic today for postop visit #2, 2 weeks status post unsuccessful attempt to remove internal hardware left foot.  The patient is in good spirits.  He had no complaints.     Objective findings: The dressings were removed and wound margins well coaptated and maintained.  There is no edema, erythema, cellulitis, drainage or bleeding noted.  Neurovascular status is intact.  Vital signs stable.     Assessment: Painful internal orthopedic fixation left foot     Plan: All sutures were removed today.  The patient was instructed to gradually return to normal activities.  He is to return to the clinic as needed.      Again, thank you for allowing me to participate in the care of your patient.        Sincerely,        Lázaro Cummins DPM

## 2022-05-04 NOTE — PROGRESS NOTES
Subjective findings: The patient return to the clinic today for postop visit #2, 2 weeks status post unsuccessful attempt to remove internal hardware left foot.  The patient is in good spirits.  He had no complaints.     Objective findings: The dressings were removed and wound margins well coaptated and maintained.  There is no edema, erythema, cellulitis, drainage or bleeding noted.  Neurovascular status is intact.  Vital signs stable.     Assessment: Painful internal orthopedic fixation left foot     Plan: All sutures were removed today.  The patient was instructed to gradually return to normal activities.  He is to return to the clinic as needed.

## 2022-10-01 ENCOUNTER — HEALTH MAINTENANCE LETTER (OUTPATIENT)
Age: 42
End: 2022-10-01

## 2022-11-02 ENCOUNTER — OFFICE VISIT (OUTPATIENT)
Dept: FAMILY MEDICINE | Facility: CLINIC | Age: 42
End: 2022-11-02
Payer: COMMERCIAL

## 2022-11-02 VITALS
BODY MASS INDEX: 45.1 KG/M2 | DIASTOLIC BLOOD PRESSURE: 81 MMHG | WEIGHT: 315 LBS | RESPIRATION RATE: 18 BRPM | HEIGHT: 70 IN | HEART RATE: 96 BPM | OXYGEN SATURATION: 99 % | SYSTOLIC BLOOD PRESSURE: 117 MMHG

## 2022-11-02 DIAGNOSIS — Z11.59 NEED FOR HEPATITIS C SCREENING TEST: ICD-10-CM

## 2022-11-02 DIAGNOSIS — T84.213S: ICD-10-CM

## 2022-11-02 DIAGNOSIS — E66.01 MORBID OBESITY (H): ICD-10-CM

## 2022-11-02 DIAGNOSIS — Z23 NEED FOR VACCINATION: ICD-10-CM

## 2022-11-02 DIAGNOSIS — Z01.818 PREOP GENERAL PHYSICAL EXAM: ICD-10-CM

## 2022-11-02 DIAGNOSIS — Z11.4 SCREENING FOR HIV (HUMAN IMMUNODEFICIENCY VIRUS): ICD-10-CM

## 2022-11-02 DIAGNOSIS — M79.672 LEFT FOOT PAIN: ICD-10-CM

## 2022-11-02 DIAGNOSIS — Z01.818 PRE-OP EXAM: Primary | ICD-10-CM

## 2022-11-02 LAB — SARS-COV-2 RNA RESP QL NAA+PROBE: NEGATIVE

## 2022-11-02 PROCEDURE — 90471 IMMUNIZATION ADMIN: CPT | Performed by: FAMILY MEDICINE

## 2022-11-02 PROCEDURE — U0003 INFECTIOUS AGENT DETECTION BY NUCLEIC ACID (DNA OR RNA); SEVERE ACUTE RESPIRATORY SYNDROME CORONAVIRUS 2 (SARS-COV-2) (CORONAVIRUS DISEASE [COVID-19]), AMPLIFIED PROBE TECHNIQUE, MAKING USE OF HIGH THROUGHPUT TECHNOLOGIES AS DESCRIBED BY CMS-2020-01-R: HCPCS | Performed by: FAMILY MEDICINE

## 2022-11-02 PROCEDURE — U0005 INFEC AGEN DETEC AMPLI PROBE: HCPCS | Performed by: FAMILY MEDICINE

## 2022-11-02 PROCEDURE — 90715 TDAP VACCINE 7 YRS/> IM: CPT | Performed by: FAMILY MEDICINE

## 2022-11-02 PROCEDURE — 99213 OFFICE O/P EST LOW 20 MIN: CPT | Mod: 25 | Performed by: FAMILY MEDICINE

## 2022-11-02 ASSESSMENT — PATIENT HEALTH QUESTIONNAIRE - PHQ9
SUM OF ALL RESPONSES TO PHQ QUESTIONS 1-9: 7
SUM OF ALL RESPONSES TO PHQ QUESTIONS 1-9: 7
10. IF YOU CHECKED OFF ANY PROBLEMS, HOW DIFFICULT HAVE THESE PROBLEMS MADE IT FOR YOU TO DO YOUR WORK, TAKE CARE OF THINGS AT HOME, OR GET ALONG WITH OTHER PEOPLE: SOMEWHAT DIFFICULT

## 2022-11-02 NOTE — PROGRESS NOTES
[address] [address]  Phone: [phone]  Fax: [phone]  Primary Provider: Dominguez Pedro  Pre-op Performing Provider: VERONICA VANEGAS      PREOPERATIVE EVALUATION:  Today's date: 11/2/2022    Oleg Daley is a 42 year old male who presents for a preoperative evaluation.    Surgical Information:  Surgery/Procedure: L foot hardware removal  Surgery Location: [facility]?  Surgeon: Dr. Bryant  Surgery Date: 11/4/22  Time of Surgery:   Where patient plans to recover: At home with family  Fax number for surgical facility:     Type of Anesthesia Anticipated: to be determined    Assessment & Plan     The proposed surgical procedure is considered LOW risk.      ICD-10-CM    1. Pre-op exam  Z01.818 Asymptomatic COVID-19 Virus (Coronavirus) by PCR Nose      2. Screening for HIV (human immunodeficiency virus)  Z11.4 HIV Antigen Antibody Combo      3. Need for hepatitis C screening test  Z11.59 Hepatitis C Screen Reflex to HCV RNA Quant and Genotype      4. Need for vaccination  Z23 TDAP VACCINE (Adacel, Boostrix)      5. Preop general physical exam  Z01.818       6. Left foot pain  M79.672       7. 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  He and his surgeon are going to try to resolve that situation later this week.    We discussed HIV and hepatitis C screening and patient will get these done the next time he has blood drawn for any reason.    Obesity is a chronic problem for patient.  He is interested in discussing risk benefits and alternatives of bariatric surgery treatment.  Referral was placed today.    Patient's PHq 9 today is equal to 7.  He has had some hypersomnia.  Is also have a lot to organize trying to get his surgery set up.  We are going to give him 2 months and  then we will follow-up.  We will evaluate for obstructive sleep apnea versus poorly controlled mood disorder.            RECOMMENDATION:  APPROVAL GIVEN to proceed with proposed procedure, without further diagnostic evaluation.              Subjective     HPI related to upcoming procedure: Removal of left foot hardware    Preop Questions 11/2/2022   1. Have you ever had a heart attack or stroke? No   2. Have you ever had surgery on your heart or blood vessels, such as a stent placement, a coronary artery bypass, or surgery on an artery in your head, neck, heart, or legs? No   3. Do you have chest pain with activity? No   4. Do you have a history of  heart failure? No   5. Do you currently have a cold, bronchitis or symptoms of other infection? No   6. Do you have a cough, shortness of breath, or wheezing? No   7. Do you or anyone in your family have previous history of blood clots? No   8. Do you or does anyone in your family have a serious bleeding problem such as prolonged bleeding following surgeries or cuts? No   9. Have you ever had problems with anemia or been told to take iron pills? No   10. Have you had any abnormal blood loss such as black, tarry or bloody stools? No   11. Have you ever had a blood transfusion? YES -    11a. Have you ever had a transfusion reaction? No   12. Are you willing to have a blood transfusion if it is medically needed before, during, or after your surgery? Yes   13. Have you or any of your relatives ever had problems with anesthesia? No   14. Do you have sleep apnea, excessive snoring or daytime drowsiness? No   15. Do you have any artifical heart valves or other implanted medical devices like a pacemaker, defibrillator, or continuous glucose monitor? No   16. Do you have artificial joints? No   17. Are you allergic to latex? No   Patient can nick a bear about 6 miles without wanting to stop and rest due to chest pain.  He can also walk up 2 flights of stairs without having to  stop and rest due to shortness of breath or chest pain.    Health Care Directive:  Patient does not have a Health Care Directive or Living Will: Discussed advance care planning with patient; information given to patient to review.    Preoperative Review of :   reviewed - no record of controlled substances prescribed.   enough to [facility]        Review of Systems  Negative except as per HPI    Patient Active Problem List    Diagnosis Date Noted     Mechanical breakdown of internal fixation device of bone of foot, initial encounter (H) 04/07/2022     Priority: Medium     Added automatically from request for surgery [id number]       Allergic rhinitis 04/06/2022     Priority: Medium     Mixed anxiety depressive disorder 04/06/2022     Priority: Medium     Restless legs syndrome 04/06/2022     Priority: Medium     Morbid obesity (H) 03/24/2022     Priority: Medium     Attention deficit disorder of adult with hyperactivity 01/01/1986     Priority: Medium      Past Medical History:   Diagnosis Date     Arthritis 2001     Depressive disorder      History of blood transfusion 2001     Other chronic pain      Past Surgical History:   Procedure Laterality Date     APPENDECTOMY       C AFF UPPER ARM/ELBOW SURGERY UNLISTED      no date     COSMETIC SURGERY  2091     FACIAL FRACTURE SURGERY      no date     FOOT SURGERY Left      ORTHOPEDIC SURGERY  2001     REMOVE HARDWARE LOWER EXTREMITY Left 04/22/2022    Procedure: ATTEMPTED , HARDWARE REMOVAL LOWER EXTREMITY;  Surgeon: Lázaro Cummins DPM;  Location: [facility]     Current Outpatient Medications   Medication Sig Dispense Refill     buPROPion (WELLBUTRIN XL) 150 MG 24 hr tablet Take 1 tablet (150 mg) by mouth every morning 90 tablet 1       No Known Allergies     Social History     Tobacco Use     Smoking status: Former     Packs/day: 1.00     Years: 10.00     Pack years: 10.00     Types: Cigarettes     Start date: 1/1/1996     Quit date: 1/1/2020     Years since  "quittin.8     Smokeless tobacco: Former   Substance Use Topics     Alcohol use: Not Currently     History   Drug Use Unknown         Objective     /81 (BP Location: Right arm, Patient Position: Sitting)   Pulse 96   Resp 18   Ht 1.765 m (5' 9.5\")   Wt (!) 150.6 kg (332 lb)   SpO2 99%   BMI 48.32 kg/m      Physical Exam      General: alert and oriented ×3 no acute distress.    HEENT: Normocephalic and atraumatic.   Eyes pupils are equal round and reactive to light extraocular motion is intact. normal conjunctiva    Hearing is grossly normal and there is no otorrhea. Tympanic membranes are pearly grey with a normal light reflex.    Nares are patent there is no rhinorrhea.     Mucous membranes are moist and pink.    Chest: has bilateral rise with no increased work of breathing. clear to auscultation without wheezes, rhonchi, or rales.    Cardiovascular: normal perfusion and brisk capillary refill. S1S2 with regular rate and rhythm and no murmurs, gallops or rubs.    Neuro: no gross focal abnormalities and memory seems intact. CN 2-12 are grossly intact.    Psychiatric: speech is clear and coherent and fluent. Patient dressed appropriately for the weather. Mood is appropriate and affect is full.          No results for input(s): HGB, PLT, INR, NA, POTASSIUM, CR, A1C in the last 94546 hours.     Diagnostics:  No labs were ordered during this visit.   No EKG required for low risk surgery (cataract, skin procedure, breast biopsy, etc).    Revised Cardiac Risk Index (RCRI):  The patient has the following serious cardiovascular risks for perioperative complications:   - No serious cardiac risks = 0 points     RCRI Interpretation: 0 points: Class I (very low risk - 0.4% complication rate)           Signed Electronically by: Kassandra Longoria MD  Copy of this evaluation report is provided to requesting physician.      Answers for HPI/ROS submitted by the patient on 2022  If you checked off any problems, " how difficult have these problems made it for you to do your work, take care of things at home, or get along with other people?: Somewhat difficult  PHQ9 TOTAL SCORE: 7

## 2022-11-02 NOTE — PATIENT INSTRUCTIONS
Preparing for Your Surgery  Getting started  A nurse will call you to review your health history and instructions. They will give you an arrival time based on your scheduled surgery time. Please be ready to share:    Your doctor s clinic name and phone number    Your medical, surgical, and anesthesia history    A list of allergies and sensitivities    A list of medicines, including herbal treatments and over-the-counter drugs    Whether the patient has a legal guardian (ask how to send us the papers in advance)  Please tell us if you re pregnant--or if there s any chance you might be pregnant. Some surgeries may injure a fetus (unborn baby), so they require a pregnancy test. Surgeries that are safe for a fetus don t always need a test, and you can choose whether to have one.   If you have a child who s having surgery, please ask for a copy of Preparing for Your Child s Surgery.    Preparing for surgery    Within 10 to 30 days of surgery: Have a pre-op exam (sometimes called an H&P, or History and Physical). This can be done at a clinic or pre-operative center.  ? If you re having a , you may not need this exam. Talk to your care team.    At your pre-op exam, talk to your care team about all medicines you take. If you need to stop any medicines before surgery, ask when to start taking them again.  ? We do this for your safety. Many medicines can make you bleed too much during surgery. Some change how well surgery (anesthesia) drugs work.    Call your insurance company to let them know you re having surgery. (If you don t have insurance, call [phone].)    Call your clinic if there s any change in your health. This includes signs of a cold or flu (sore throat, runny nose, cough, rash, fever). It also includes a scrape or scratch near the surgery site.    If you have questions on the day of surgery, call your hospital or surgery center.  COVID testing  You may need to be tested for COVID-19 before having  surgery. If so, we will give you instructions (or click here).  Eating and drinking guidelines  For your safety: Unless your surgeon tells you otherwise, follow the guidelines below.    Eat and drink as usual until 8 hours before you arrive for surgery. After that, no food or milk.    Drink clear liquids until 2 hours before you arrive. These are liquids you can see through, like water, Gatorade, and Propel Water. They also include plain black coffee and tea (no cream or milk), candy, and breath mints. You can spit out gum when you arrive.    If you drink alcohol: Stop drinking it the night before surgery.    If your care team tells you to take medicine on the morning of surgery, it s okay to take it with a sip of water.  Preventing infection    Shower or bathe the night before and morning of your surgery. Follow the instructions your clinic gave you. (If no instructions, use regular soap.)    Don t shave or clip hair near your surgery site. We ll remove the hair if needed.    Don t smoke or vape the morning of surgery. You may chew nicotine gum up to 2 hours before surgery. A nicotine patch is okay.  ? Note: Some surgeries require you to completely quit smoking and nicotine. Check with your surgeon.    Your care team will make every effort to keep you safe from infection. We will:  ? Clean our hands often with soap and water (or an alcohol-based hand rub).  ? Clean the skin at your surgery site with a special soap that kills germs.  ? Give you a special gown to keep you warm. (Cold raises the risk of infection.)  ? Wear special hair covers, masks, gowns and gloves during surgery.  ? Give antibiotic medicine, if prescribed. Not all surgeries need antibiotics.  What to bring on the day of surgery    Photo ID and insurance card    Copy of your health care directive, if you have one    Glasses and hearing aids (bring cases)  ? You can t wear contacts during surgery    Inhaler and eye drops, if you use them (tell us  about these when you arrive)    CPAP machine or breathing device, if you use them    A few personal items, if spending the night    If you have . . .  ? A pacemaker, ICD (cardiac defibrillator) or other implant: Bring the ID card.  ? An implanted stimulator: Bring the remote control.  ? A legal guardian: Bring a copy of the certified (court-stamped) guardianship papers.  Please remove any jewelry, including body piercings. Leave jewelry and other valuables at home.  If you re going home the day of surgery    You must have a responsible adult drive you home. They should stay with you overnight as well.    If you don t have someone to stay with you, and you aren t safe to go home alone, we may keep you overnight. Insurance often won t pay for this.  After surgery  If it s hard to control your pain or you need more pain medicine, please call your surgeon s office.  Questions?   If you have any questions for your care team, list them here:   ____________________________________________________________________________________________________________________________________________________________________________________________________________________________________________________________________  For informational purposes only. Not to replace the advice of your health care provider. Copyright   2003, 2019 [employer]. All rights reserved. Clinically reviewed by Anitha Morse MD. [employer] [id number] - REV 10/22.

## 2023-01-31 ENCOUNTER — VIRTUAL VISIT (OUTPATIENT)
Dept: SURGERY | Facility: CLINIC | Age: 43
End: 2023-01-31
Payer: COMMERCIAL

## 2023-01-31 DIAGNOSIS — E66.01 MORBID OBESITY WITH BMI OF 40.0-44.9, ADULT (H): Primary | ICD-10-CM

## 2023-01-31 PROCEDURE — 97802 MEDICAL NUTRITION INDIV IN: CPT | Mod: 95
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
## 2023-02-03 ENCOUNTER — E-VISIT (OUTPATIENT)
Dept: FAMILY MEDICINE | Facility: CLINIC | Age: 43
End: 2023-02-03
Payer: COMMERCIAL

## 2023-02-03 DIAGNOSIS — R53.83 FATIGUE, UNSPECIFIED TYPE: Primary | ICD-10-CM

## 2023-02-03 PROCEDURE — 99421 OL DIG E/M SVC 5-10 MIN: CPT | Performed by: FAMILY MEDICINE

## 2023-02-07 NOTE — PATIENT INSTRUCTIONS
Thank you for choosing us for your care. Given your symptoms, I would like you to do a lab-only visit to determine what is causing them.  I have placed the orders.  Please schedule an appointment with the lab right here in [place], or call [phone].  I will let you know when the results are back and next steps to take.  Thank you for choosing us for your care. I think an in-clinic visit would be best next steps based on your symptoms. Please schedule a clinic appointment; you won t be charged for this eVisit.      You can schedule an appointment right here in [place], or call [phone]

## 2023-02-08 ENCOUNTER — LAB (OUTPATIENT)
Dept: LAB | Facility: CLINIC | Age: 43
End: 2023-02-08
Payer: COMMERCIAL

## 2023-02-08 DIAGNOSIS — Z11.4 SCREENING FOR HIV (HUMAN IMMUNODEFICIENCY VIRUS): ICD-10-CM

## 2023-02-08 DIAGNOSIS — R89.9 ABNORMAL LABORATORY TEST: ICD-10-CM

## 2023-02-08 DIAGNOSIS — R53.83 FATIGUE, UNSPECIFIED TYPE: ICD-10-CM

## 2023-02-08 DIAGNOSIS — Z11.59 NEED FOR HEPATITIS C SCREENING TEST: ICD-10-CM

## 2023-02-08 LAB
ALBUMIN SERPL BCG-MCNC: 4.2 G/DL (ref 3.5–5.2)
ALP SERPL-CCNC: 68 U/L (ref 40–129)
ALT SERPL W P-5'-P-CCNC: 51 U/L (ref 10–50)
ANION GAP SERPL CALCULATED.3IONS-SCNC: 11 MMOL/L (ref 7–15)
AST SERPL W P-5'-P-CCNC: 25 U/L (ref 10–50)
BASOPHILS # BLD AUTO: 0.1 10E3/UL (ref 0–0.2)
BASOPHILS NFR BLD AUTO: 1 %
BILIRUB SERPL-MCNC: 0.3 MG/DL
BUN SERPL-MCNC: 13.8 MG/DL (ref 6–20)
CALCIUM SERPL-MCNC: 9.6 MG/DL (ref 8.6–10)
CHLORIDE SERPL-SCNC: 105 MMOL/L (ref 98–107)
CREAT SERPL-MCNC: 0.83 MG/DL (ref 0.67–1.17)
DEPRECATED HCO3 PLAS-SCNC: 24 MMOL/L (ref 22–29)
EOSINOPHIL # BLD AUTO: 0.7 10E3/UL (ref 0–0.7)
EOSINOPHIL NFR BLD AUTO: 7 %
ERYTHROCYTE [DISTWIDTH] IN BLOOD BY AUTOMATED COUNT: 13.1 % (ref 10–15)
ERYTHROCYTE [SEDIMENTATION RATE] IN BLOOD BY WESTERGREN METHOD: 12 MM/HR (ref 0–15)
GFR SERPL CREATININE-BSD FRML MDRD: >90 ML/MIN/1.73M2
GLUCOSE SERPL-MCNC: 99 MG/DL (ref 70–99)
HCT VFR BLD AUTO: 46.1 % (ref 40–53)
HGB BLD-MCNC: 15.5 G/DL (ref 13.3–17.7)
IMM GRANULOCYTES # BLD: 0 10E3/UL
IMM GRANULOCYTES NFR BLD: 0 %
LYMPHOCYTES # BLD AUTO: 2.8 10E3/UL (ref 0.8–5.3)
LYMPHOCYTES NFR BLD AUTO: 30 %
MCH RBC QN AUTO: 29.5 PG (ref 26.5–33)
MCHC RBC AUTO-ENTMCNC: 33.6 G/DL (ref 31.5–36.5)
MCV RBC AUTO: 88 FL (ref 78–100)
MONOCYTES # BLD AUTO: 0.7 10E3/UL (ref 0–1.3)
MONOCYTES NFR BLD AUTO: 8 %
NEUTROPHILS # BLD AUTO: 5.2 10E3/UL (ref 1.6–8.3)
NEUTROPHILS NFR BLD AUTO: 55 %
PLATELET # BLD AUTO: 375 10E3/UL (ref 150–450)
POTASSIUM SERPL-SCNC: 4.1 MMOL/L (ref 3.4–5.3)
PROT SERPL-MCNC: 7.3 G/DL (ref 6.4–8.3)
RBC # BLD AUTO: 5.25 10E6/UL (ref 4.4–5.9)
SODIUM SERPL-SCNC: 140 MMOL/L (ref 136–145)
TSH SERPL DL<=0.005 MIU/L-ACNC: 2.51 UIU/ML (ref 0.3–4.2)
WBC # BLD AUTO: 9.5 10E3/UL (ref 4–11)

## 2023-02-08 PROCEDURE — 36415 COLL VENOUS BLD VENIPUNCTURE: CPT

## 2023-02-08 PROCEDURE — 85652 RBC SED RATE AUTOMATED: CPT

## 2023-02-08 PROCEDURE — 80050 GENERAL HEALTH PANEL: CPT | Performed by: FAMILY MEDICINE

## 2023-02-08 PROCEDURE — 86803 HEPATITIS C AB TEST: CPT

## 2023-02-08 PROCEDURE — 87902 NFCT AGT GNTYP ALYS HEP C: CPT

## 2023-02-08 PROCEDURE — 87389 HIV-1 AG W/HIV-1&-2 AB AG IA: CPT

## 2023-02-09 LAB — HIV 1+2 AB+HIV1 P24 AG SERPL QL IA: NONREACTIVE

## 2023-02-10 ENCOUNTER — TELEPHONE (OUTPATIENT)
Dept: SURGERY | Facility: CLINIC | Age: 43
End: 2023-02-10
Payer: COMMERCIAL

## 2023-02-10 LAB — HCV AB SERPL QL IA: REACTIVE

## 2023-02-10 NOTE — TELEPHONE ENCOUNTER
I spoke with Sami this morning to change around his intro appointment.  He was initially scheduled to meet with Dr. Becker in January, however, needed to be rescheduled due to providers illness.  He was scheduled out to May but had already started visits with Ernesto.  I am keeping him scheduled with Ernesto in February since his insurance requires 6 months of SWL.  I have scheduled a video visit with Dr. Lawler for March and then a follow up in person in May.  I have also scheduled his visits with Rodolfo for the end of April and beginning of May.  Discussed what a task list will look like labs, support group, etc.  Also provided info to reach out to Rodolfo to attend the support group.

## 2023-02-10 NOTE — TELEPHONE ENCOUNTER
Tasklist started.    Adriana Cruz RN, CBN  [facility]  P [phone]  F [phone]

## 2023-02-12 ENCOUNTER — TELEPHONE (OUTPATIENT)
Dept: FAMILY MEDICINE | Facility: CLINIC | Age: 43
End: 2023-02-12
Payer: COMMERCIAL

## 2023-02-12 DIAGNOSIS — R89.9 ABNORMAL LABORATORY TEST: Primary | ICD-10-CM

## 2023-02-13 LAB — HCV RNA SERPL NAA+PROBE-ACNC: NOT DETECTED IU/ML

## 2023-02-16 DIAGNOSIS — E66.01 MORBID OBESITY WITH BODY MASS INDEX (BMI) OF 40.0 TO 49.9 (H): Primary | ICD-10-CM

## 2023-02-16 PROBLEM — E78.00 ELEVATED LDL CHOLESTEROL LEVEL: Status: ACTIVE | Noted: 2023-02-16

## 2023-02-17 NOTE — TELEPHONE ENCOUNTER
Called pt to let him know that  put his initial labs in and he can have them drawn anytime. He says he will do them at [facility] the week of 2/20/23.    Adriana Cruz RN, CBN  [facility]  P [phone]  F [phone]

## 2023-02-22 NOTE — RESULT ENCOUNTER NOTE
The confirmation test for the hepatitis C has come back normal.  Your screening test was a false positive.  Your liver enzyme was one-point above normal.  We can check this again next year when we do your annual exam.  In the meantime getting to a healthier weight should be good for your liver. 01-Apr-2021

## 2023-02-28 ENCOUNTER — VIRTUAL VISIT (OUTPATIENT)
Dept: SURGERY | Facility: CLINIC | Age: 43
End: 2023-02-28
Payer: COMMERCIAL

## 2023-02-28 DIAGNOSIS — E66.01 MORBID OBESITY WITH BMI OF 40.0-44.9, ADULT (H): Primary | ICD-10-CM

## 2023-02-28 PROCEDURE — 97803 MED NUTRITION INDIV SUBSEQ: CPT | Mod: VID

## 2023-02-28 NOTE — PROGRESS NOTES
"Oleg Daley is a 42 year old who is being evaluated via a billable video visit.      How would you like to obtain your AVS? MyChart  If the video visit is dropped, the invitation should be resent by: Text to cell phone: [phone]  Will anyone else be joining your video visit? No :[id number]}        Follow Up Surgical Weight Loss Supervised Diet Evaluation  Rd visit 2/6    Assessment:  Pt. is being seen today for a follow up RD nutritional evaluation. Pt. has been unsuccessful with non-surgical weight loss methods and is interested in bariatric surgery. Today we reviewed current eating habits and level of physical activity, and instructed on the changes that are required for successful bariatric outcomes.    Surgery of interest per pt: Not decided yet.    Workflow review:  Support Group: Not completed.  Psychology:Not completed.  Lab work:Not completed.  SWL:Yes 2/6      Weight goal: unknown at this time .    Anthropometrics:  Pt's weight is 327 lbs  Initial weight: 335 lbs  Weight change: down 8 lbs   BMI: 47.6 kg/m2  Patient weight not recorded  Estimated RMR ([place]-St Jeor equation):  2280 kcals x 1.2 (sedentary) = 2730 kcals (for weight maintenance)      Medical History:  Patient Active Problem List   Diagnosis     Morbid obesity with body mass index (BMI) of 40.0 to 49.9 (H)     Allergic rhinitis     Attention deficit disorder of adult with hyperactivity     Mixed anxiety depressive disorder     Restless legs syndrome     Mechanical breakdown of internal fixation device of bone of foot, initial encounter (H)     Elevated LDL cholesterol level      Diabetes: No  HbA1c:  No results found for: HGBA1C    Progress over past month: Changing a lot of habits. Will now have a protein shake instead of snacking on empty carbs. No longer buying chips. Buying chicken and fish more than \"meat and potatoes\". Incorporating air fryer vs cooking with deep fryer. Had significantly decreased energy drink intake. Moving a " lot more at work.    Discussed having skim or 1% over 2% milk.    Diet Recall/Time  Breakfast: eggs (boiled) with small amount of butter or oatmeal with small amount of brown sugar and protein shake or grain cereal  Lunch: protein shake OR Salads with chicken with cheese light dressing   Dinner: chicken and/or fish occasionally steak with vegetables potatoes     Typical Snacks: protein shake sometimes 3x per day (60grams each), yogurt with granola     Eating out: <1x per week     Meal duration: > 30 minutes.      fluids by 30 minutes before, during meal, and waiting 30 minutes after meal before drinking fluids: Yes    Beverages  Coffee, water or milk with protein shake, Milk 2%, water,  occasional energy drink     Exercise  Moving around more at work    PES statement:   (NI-1.3) Excessive energy intake related to Food and nutrition related knowledge deficit concerning excessive energy/oral intake as evidenced by Intake of high caloric density foods/beverages (juice, soda, alcohol) at meals and/or snacks; large portions; frequent grazing; estimated intake that exceeds estimated daily energy intake; binge eating patterns; frequent fast food or restaurant intake; and BMI 47.6 kg/m2     Intervention    Nutrition Education:   1. Provided general overview of diet and lifestyle modifications needed to be a deemed a safe candidate for bariatric surgery.   2. Educated patient on how to read a food label: choosing foods with than 10 grams fat and 10 grams sugar per serving to avoid dumping syndrome.  3. Dumping Syndrome: Described the mechanisms of syndrome, symptoms, and prevention tools from a dietary perspective.   4. Vitamins: Educated on post-op vitamin regimen including MVI+ 18 mg Fe two times a day, calcium citrate 400-600 mg two times a day, 8464-9843 mcg sublingual B12 daily, 5000 IU vitamin D3 daily.     Food/Nutrient Delivery:  5. Educated patient on eating three meals, with cutting out  snacking.  6. Bariatric Plate: Patient and I discussed the importance of including a lean protein source (20-30 grams/meal), vegetables (included at lunch and dinner), one serving (15g) of carbohydrate, and limited added fat (1 tb/day) at each meal.   7. Educated patient on how to complete a food journal and benefits of meal planning.   8. Educated patient on using a protein powder drink as a meal replacement and/or supplement after bariatric surgery.   9. Discussed importance of adequate hydration after surgery, with goal of at least 64 oz of fluids/day.  10. Addressed avoiding all carbonated, caffeinated and sweetened drinks to prepare for bariatric surgery.     Nutrition Counselin. Mindful eating techniques: Encouraged slow meal pace, chewing foods to applesauce consistency for 20-30 minutes/meal.   12. Discussed  fluids 30 minutes before, during, and after meal to prevent dumping syndrome and discomfort post bariatric surgery.   13. Discussed pre/post operative diet progression, post op vitamin regimen, gave review of surgery process.     Instructions/Goals:     1. Continue implementing bariatric surgery lifestyle modifications.  2. Fill out food journal and return at follow up.    Handouts Provided:   My Plate     Monitor/Evaluation:  Pt. s target weight: no gain from initial visit, pt. verbalized understanding.       Plan for next visit:   Continue implementing the bariatric diet guidelines.           Video-Visit Details    Type of service:  Video Visit    Video Start Time (time video started): 1:00 pm    Video End Time (time video stopped): 1:25 pm    Originating Location (pt. Location): Home        Distant Location (provider location):  Off-site    Mode of Communication:  Video Conference via [facility]    Physician has received verbal consent for a Video Visit from the patient? Yes      Cira Tay RD

## 2023-02-28 NOTE — LETTER
2/28/2023         RE: Oleg Daley  [address]  [place] [address]        Dear Colleague,    Thank you for referring your patient, Oleg Daley, to the [facility] [place]. Please see a copy of my visit note below.    Oleg Daley is a 42 year old who is being evaluated via a billable video visit.      How would you like to obtain your AVS? MyChart  If the video visit is dropped, the invitation should be resent by: Text to cell phone: [phone]  Will anyone else be joining your video visit? No :[id number]}        Follow Up Surgical Weight Loss Supervised Diet Evaluation  Rd visit 2/6    Assessment:  Pt. is being seen today for a follow up RD nutritional evaluation. Pt. has been unsuccessful with non-surgical weight loss methods and is interested in bariatric surgery. Today we reviewed current eating habits and level of physical activity, and instructed on the changes that are required for successful bariatric outcomes.    Surgery of interest per pt: Not decided yet.    Workflow review:  Support Group: Not completed.  Psychology:Not completed.  Lab work:Not completed.  SWL:Yes 2/6      Weight goal: unknown at this time .    Anthropometrics:  Pt's weight is 327 lbs  Initial weight: 335 lbs  Weight change: down 8 lbs   BMI: 47.6 kg/m2  Patient weight not recorded  Estimated RMR ([place]-St Jeor equation):  2280 kcals x 1.2 (sedentary) = 2730 kcals (for weight maintenance)      Medical History:  Patient Active Problem List   Diagnosis     Morbid obesity with body mass index (BMI) of 40.0 to 49.9 (H)     Allergic rhinitis     Attention deficit disorder of adult with hyperactivity     Mixed anxiety depressive disorder     Restless legs syndrome     Mechanical breakdown of internal fixation device of bone of foot, initial encounter (H)     Elevated LDL cholesterol level      Diabetes: No  HbA1c:  No results found for: HGBA1C    Progress over past month:  "Changing a lot of habits. Will now have a protein shake instead of snacking on empty carbs. No longer buying chips. Buying chicken and fish more than \"meat and potatoes\". Incorporating air fryer vs cooking with deep fryer. Had significantly decreased energy drink intake. Moving a lot more at work.    Discussed having skim or 1% over 2% milk.    Diet Recall/Time  Breakfast: eggs (boiled) with small amount of butter or oatmeal with small amount of brown sugar and protein shake or grain cereal  Lunch: protein shake OR Salads with chicken with cheese light dressing   Dinner: chicken and/or fish occasionally steak with vegetables potatoes     Typical Snacks: protein shake sometimes 3x per day (60grams each), yogurt with granola     Eating out: <1x per week     Meal duration: > 30 minutes.      fluids by 30 minutes before, during meal, and waiting 30 minutes after meal before drinking fluids: Yes    Beverages  Coffee, water or milk with protein shake, Milk 2%, water,  occasional energy drink     Exercise  Moving around more at work    PES statement:   (NI-1.3) Excessive energy intake related to Food and nutrition related knowledge deficit concerning excessive energy/oral intake as evidenced by Intake of high caloric density foods/beverages (juice, soda, alcohol) at meals and/or snacks; large portions; frequent grazing; estimated intake that exceeds estimated daily energy intake; binge eating patterns; frequent fast food or restaurant intake; and BMI 47.6 kg/m2     Intervention    Nutrition Education:   1. Provided general overview of diet and lifestyle modifications needed to be a deemed a safe candidate for bariatric surgery.   2. Educated patient on how to read a food label: choosing foods with than 10 grams fat and 10 grams sugar per serving to avoid dumping syndrome.  3. Dumping Syndrome: Described the mechanisms of syndrome, symptoms, and prevention tools from a dietary perspective.   4. Vitamins: Educated " on post-op vitamin regimen including MVI+ 18 mg Fe two times a day, calcium citrate 400-600 mg two times a day, 6887-8780 mcg sublingual B12 daily, 5000 IU vitamin D3 daily.     Food/Nutrient Delivery:  5. Educated patient on eating three meals, with cutting out snacking.  6. Bariatric Plate: Patient and I discussed the importance of including a lean protein source (20-30 grams/meal), vegetables (included at lunch and dinner), one serving (15g) of carbohydrate, and limited added fat (1 tb/day) at each meal.   7. Educated patient on how to complete a food journal and benefits of meal planning.   8. Educated patient on using a protein powder drink as a meal replacement and/or supplement after bariatric surgery.   9. Discussed importance of adequate hydration after surgery, with goal of at least 64 oz of fluids/day.  10. Addressed avoiding all carbonated, caffeinated and sweetened drinks to prepare for bariatric surgery.     Nutrition Counselin. Mindful eating techniques: Encouraged slow meal pace, chewing foods to applesauce consistency for 20-30 minutes/meal.   12. Discussed  fluids 30 minutes before, during, and after meal to prevent dumping syndrome and discomfort post bariatric surgery.   13. Discussed pre/post operative diet progression, post op vitamin regimen, gave review of surgery process.     Instructions/Goals:     1. Continue implementing bariatric surgery lifestyle modifications.  2. Fill out food journal and return at follow up.    Handouts Provided:   My Plate     Monitor/Evaluation:  Pt. s target weight: no gain from initial visit, pt. verbalized understanding.       Plan for next visit:   Continue implementing the bariatric diet guidelines.           Video-Visit Details    Type of service:  Video Visit    Video Start Time (time video started): 1:00 pm    Video End Time (time video stopped): 1:25 pm    Originating Location (pt. Location): Home        Distant Location (provider  location):  Off-site    Mode of Communication:  Video Conference via [facility]    Physician has received verbal consent for a Video Visit from the patient? Yes      Cira Tay RD              Again, thank you for allowing me to participate in the care of your patient.        Sincerely,        Cira Tay RD

## 2023-03-22 ENCOUNTER — VIRTUAL VISIT (OUTPATIENT)
Dept: SURGERY | Facility: CLINIC | Age: 43
End: 2023-03-22
Payer: COMMERCIAL

## 2023-03-22 DIAGNOSIS — E66.01 MORBID OBESITY WITH BODY MASS INDEX (BMI) OF 40.0 TO 49.9 (H): Primary | ICD-10-CM

## 2023-03-22 PROCEDURE — 97803 MED NUTRITION INDIV SUBSEQ: CPT | Mod: VID

## 2023-03-22 NOTE — LETTER
3/22/2023         RE: Oleg Daley  [address]  [facility] [address]        Dear Colleague,    Thank you for referring your patient, Oleg Daley, to the [facility] [place]. Please see a copy of my visit note below.    Oleg Daley is a 42 year old who is being evaluated via a billable video visit.      How would you like to obtain your AVS? MyChart  If the video visit is dropped, the invitation should be resent by: Text to cell phone: [phone]  Will anyone else be joining your video visit? No :[id number]}        Follow Up Surgical Weight Loss Supervised Diet Evaluation      Assessment:  Pt. is being seen today for a follow up RD nutritional evaluation. Pt. has been unsuccessful with non-surgical weight loss methods and is interested in bariatric surgery. Today we reviewed current eating habits and level of physical activity, and instructed on the changes that are required for successful bariatric outcomes.    Surgery of interest per pt: Not decided yet.    Workflow review:  Support Group: Not completed.  Psychology:Not completed.  Lab work:Not completed.  SWL:Yes 3/6      Weight goal: unknown at this time .    Anthropometrics:  Pt's weight is 326 lbs  Initial weight: 335 lbs  Weight change: down 8 lbs   BMI:  47.6 kg/m2  Patient weight not recorded  Estimated RMR ([place]-St Jeor equation):  2280 kcals x 1.2 (sedentary) = 2730 kcals (for weight maintenance)      Medical History:  Patient Active Problem List   Diagnosis     Morbid obesity with body mass index (BMI) of 40.0 to 49.9 (H)     Allergic rhinitis     Attention deficit disorder of adult with hyperactivity     Mixed anxiety depressive disorder     Restless legs syndrome     Mechanical breakdown of internal fixation device of bone of foot, initial encounter (H)     Elevated LDL cholesterol level      Diabetes: No  HbA1c:  No results found for: HGBA1C    Progress over past month: Patient  has maintained current bariatric habits. Mindfully chewing food down to smooth consistency. Incorporating breakfast more often. Had weaned down on energy drinks and is now down to 2 per day vs 5. Currently drinking 1% milk instead of 2%. Having about 3 protein shakes per day. We dicussed using 1/2 scoop as one scoop is 60g protein. Trying different cooking techniques to incorporate more vegetables into his diet.         Diet Recall/Time  Breakfast 7-8am: eggs (boiled) with small amount of butter or oatmeal with small amount of brown sugar and protein shake or grain cereal    Lunch: ham and cheese wrap OR protein shake OR Salads with chicken with cheese light dressing   Dinner: chicken and/or fish occasionally steak with vegetables potatoes     Typical Snacks: protein shake, yogurt with granola     Eating out: <1x per week     Meal duration:  30 minutes.      fluids by 30 minutes before, during meal, and waiting 30 minutes after meal before drinking fluids: Yes    Beverages  Coffee, water or milk with protein shake, Milk 1%, water,  Energy drink     Exercise  Moving around more at work    PES statement:   (NI-1.3) Excessive energy intake related to Food and nutrition related knowledge deficit concerning excessive energy/oral intake as evidenced by Intake of high caloric density foods/beverages (juice, soda, alcohol) at meals and/or snacks; large portions; frequent grazing; estimated intake that exceeds estimated daily energy intake; binge eating patterns; frequent fast food or restaurant intake; and BMI 47.6 kg/m2     Intervention    Nutrition Education:   1. Provided general overview of diet and lifestyle modifications needed to be a deemed a safe candidate for bariatric surgery.   2. Educated patient on how to read a food label: choosing foods with than 10 grams fat and 10 grams sugar per serving to avoid dumping syndrome.  3. Dumping Syndrome: Described the mechanisms of syndrome, symptoms, and  prevention tools from a dietary perspective.   4. Vitamins: Educated on post-op vitamin regimen including MVI+ 18 mg Fe two times a day, calcium citrate 400-600 mg two times a day, 2635-8662 mcg sublingual B12 daily, 5000 IU vitamin D3 daily.     Food/Nutrient Delivery:  5. Educated patient on eating three meals, with cutting out snacking.  6. Bariatric Plate: Patient and I discussed the importance of including a lean protein source (20-30 grams/meal), vegetables (included at lunch and dinner), one serving (15g) of carbohydrate, and limited added fat (1 tb/day) at each meal.   7. Educated patient on how to complete a food journal and benefits of meal planning.   8. Educated patient on using a protein powder drink as a meal replacement and/or supplement after bariatric surgery.   9. Discussed importance of adequate hydration after surgery, with goal of at least 64 oz of fluids/day.  10. Addressed avoiding all carbonated, caffeinated and sweetened drinks to prepare for bariatric surgery.     Nutrition Counselin. Mindful eating techniques: Encouraged slow meal pace, chewing foods to applesauce consistency for 20-30 minutes/meal.   12. Discussed  fluids 30 minutes before, during, and after meal to prevent dumping syndrome and discomfort post bariatric surgery.   13. Discussed pre/post operative diet progression, post op vitamin regimen, gave review of surgery process.     Instructions/Goals:     1. Continue implementing bariatric surgery lifestyle modifications.  2. Fill out food journal and return at follow up.      Monitor/Evaluation:  Pt. s target weight: no gain from initial visit, pt. verbalized understanding.       Plan for next visit:   Continue implementing the bariatric diet guidelines.  Start tracking calorie intake aiming for ~2,100kcal per day        Video-Visit Details    Type of service:  Video Visit    Video Start Time (time video started): 1:22 pm    Video End Time (time video stopped):  1:45 pm    Originating Location (pt. Location): Home        Distant Location (provider location):  Off-site    Mode of Communication:  Video Conference via [facility]    Physician has received verbal consent for a Video Visit from the patient? Yes      Cira Tay RD            Again, thank you for allowing me to participate in the care of your patient.        Sincerely,        Cira Tay RD

## 2023-03-22 NOTE — PROGRESS NOTES
Oleg Daley is a 42 year old who is being evaluated via a billable video visit.      How would you like to obtain your AVS? MyChart  If the video visit is dropped, the invitation should be resent by: Text to cell phone: [phone]  Will anyone else be joining your video visit? No :[id number]}        Follow Up Surgical Weight Loss Supervised Diet Evaluation      Assessment:  Pt. is being seen today for a follow up RD nutritional evaluation. Pt. has been unsuccessful with non-surgical weight loss methods and is interested in bariatric surgery. Today we reviewed current eating habits and level of physical activity, and instructed on the changes that are required for successful bariatric outcomes.    Surgery of interest per pt: Not decided yet.    Workflow review:  Support Group: Not completed.  Psychology:Not completed.  Lab work:Not completed.  SWL:Yes 3/6      Weight goal: unknown at this time .    Anthropometrics:  Pt's weight is 326 lbs  Initial weight: 335 lbs  Weight change: down 8 lbs   BMI:  47.6 kg/m2  Patient weight not recorded  Estimated RMR ([place]-St Jeor equation):  2280 kcals x 1.2 (sedentary) = 2730 kcals (for weight maintenance)      Medical History:  Patient Active Problem List   Diagnosis     Morbid obesity with body mass index (BMI) of 40.0 to 49.9 (H)     Allergic rhinitis     Attention deficit disorder of adult with hyperactivity     Mixed anxiety depressive disorder     Restless legs syndrome     Mechanical breakdown of internal fixation device of bone of foot, initial encounter (H)     Elevated LDL cholesterol level      Diabetes: No  HbA1c:  No results found for: HGBA1C    Progress over past month: Patient has maintained current bariatric habits. Mindfully chewing food down to smooth consistency. Incorporating breakfast more often. Had weaned down on energy drinks and is now down to 2 per day vs 5. Currently drinking 1% milk instead of 2%. Having about 3 protein shakes per day. We  dicussed using 1/2 scoop as one scoop is 60g protein. Trying different cooking techniques to incorporate more vegetables into his diet.         Diet Recall/Time  Breakfast 7-8am: eggs (boiled) with small amount of butter or oatmeal with small amount of brown sugar and protein shake or grain cereal    Lunch: ham and cheese wrap OR protein shake OR Salads with chicken with cheese light dressing   Dinner: chicken and/or fish occasionally steak with vegetables potatoes     Typical Snacks: protein shake, yogurt with granola     Eating out: <1x per week     Meal duration:  30 minutes.      fluids by 30 minutes before, during meal, and waiting 30 minutes after meal before drinking fluids: Yes    Beverages  Coffee, water or milk with protein shake, Milk 1%, water,  Energy drink     Exercise  Moving around more at work    PES statement:   (NI-1.3) Excessive energy intake related to Food and nutrition related knowledge deficit concerning excessive energy/oral intake as evidenced by Intake of high caloric density foods/beverages (juice, soda, alcohol) at meals and/or snacks; large portions; frequent grazing; estimated intake that exceeds estimated daily energy intake; binge eating patterns; frequent fast food or restaurant intake; and BMI 47.6 kg/m2     Intervention    Nutrition Education:   1. Provided general overview of diet and lifestyle modifications needed to be a deemed a safe candidate for bariatric surgery.   2. Educated patient on how to read a food label: choosing foods with than 10 grams fat and 10 grams sugar per serving to avoid dumping syndrome.  3. Dumping Syndrome: Described the mechanisms of syndrome, symptoms, and prevention tools from a dietary perspective.   4. Vitamins: Educated on post-op vitamin regimen including MVI+ 18 mg Fe two times a day, calcium citrate 400-600 mg two times a day, 8512-9564 mcg sublingual B12 daily, 5000 IU vitamin D3 daily.     Food/Nutrient Delivery:  5. Educated  patient on eating three meals, with cutting out snacking.  6. Bariatric Plate: Patient and I discussed the importance of including a lean protein source (20-30 grams/meal), vegetables (included at lunch and dinner), one serving (15g) of carbohydrate, and limited added fat (1 tb/day) at each meal.   7. Educated patient on how to complete a food journal and benefits of meal planning.   8. Educated patient on using a protein powder drink as a meal replacement and/or supplement after bariatric surgery.   9. Discussed importance of adequate hydration after surgery, with goal of at least 64 oz of fluids/day.  10. Addressed avoiding all carbonated, caffeinated and sweetened drinks to prepare for bariatric surgery.     Nutrition Counselin. Mindful eating techniques: Encouraged slow meal pace, chewing foods to applesauce consistency for 20-30 minutes/meal.   12. Discussed  fluids 30 minutes before, during, and after meal to prevent dumping syndrome and discomfort post bariatric surgery.   13. Discussed pre/post operative diet progression, post op vitamin regimen, gave review of surgery process.     Instructions/Goals:     1. Continue implementing bariatric surgery lifestyle modifications.  2. Fill out food journal and return at follow up.      Monitor/Evaluation:  Pt. s target weight: no gain from initial visit, pt. verbalized understanding.       Plan for next visit:   Continue implementing the bariatric diet guidelines.  Start tracking calorie intake aiming for ~2,100kcal per day        Video-Visit Details    Type of service:  Video Visit    Video Start Time (time video started): 1:22 pm    Video End Time (time video stopped): 1:45 pm    Originating Location (pt. Location): Home        Distant Location (provider location):  Off-site    Mode of Communication:  Video Conference via [facility]    Physician has received verbal consent for a Video Visit from the patient? Yes      Cira Tay RD

## 2023-03-29 NOTE — PATIENT INSTRUCTIONS
[facility]    Remember to:    -Eat 3 meals a day (not 2, not 5) Chew your food well/SLOW down  -Eat your protein first  -Be a water drinker/Minize liquid calories (no regular pop, no juice) skim or 1% milk OK  -Sleep 7-8 hours each night. Address sleep if problematic  -Stress management is important. Address if problematic  -Move-8000 steps daily Muscle: maintain your muscle mass (strength training 2X/wk)  -Wheat, not white (bread, pasta, crackers, millicent, bagels, tortillas, rice)  -Limit restaurant, cafeteria, take out, drive through to 2 times per week or less  -Minimize caffeine, alcohol, and night-time snacking  -Consider keeping a food diary (i.e. My Fitness Pal, Lose It, or other food tracker)  -Follow up with the dietitian      **Some lean proteins: chicken, turkey, tuna, salmon, crab, fish, shrimp, scallops, lobster, lean cuts of beef and pork, luncheon meats, veggie burgers, beans (black, lima, garbanzo, obregon, kidney, refried), chile, cottage cheese, string cheese, other cheese, eggs, tofu, peanut butter, nuts, vegan crumbles, greek yogurt     MEDICATIONS FOR WEIGHT LOSS    PHENTERMINE (Adipex): approved in 1959 for appetite suppression.  It has stimulant effects and cannot be used with Ritalin, Concerta, or other stimulants.  It is not addictive although it's chemically related to amphetamines.  Amphetamines are addictive. The most common side effects are dry mouth, increased energy and concentration, increased pulse, and constipation.  You should not take phentermine if you have glaucoma, hyperthyroidism, or uncontrolled/untreated hypertension.  $24-$30 for 90 tablets    PHENDIMETRAZINE (Bontril): Appetite suppressant/sympathomimetic.  Controlled substance.  Side effects and contraindications similar to phentermine.  $45-$60 for 3 month supply    TOPIRAMATE (Topamax): Anti-seizure medication, also used to prevent migraines.  Side effects include paresthesia, glaucoma, altered  concentration, attention difficulties, memory and speech problems, metabolic acidosis, depression, increase in body temperature and decrease sweating, kidney stones, and weight loss.  Do not take Topamax while taking Depakote as this can cause high ammonia levels.  You must have reliable birth control as Topamax can cause birth defects.  Discontinue slowly to avoid seizure.  Insurance usually covers Topiramate.    QSYMIA (Phentermine + Topamax):  See above information about phentermine and Topamax.  Most common side effects are paresthesia, dizziness, distortion of taste, insomnia, constipation, and dry mouth.  $150-$220 per month    DIETHYLPROPION (Tenuate): Sympathomimetic amine.  Appetite suppressant.  Doses 25 mg before meals or 75 mg per day.  Most common side effects are hypertension, palpitations, EKG changes, and increased seizures in epileptics.  There can be a possible adverse reaction with alcohol.  $70-$90 per 3 months    XENICAL(Orlistat) (Bala OTC): Approved in 1999.  A fat-blocker.  It reduces absorption of fat by approximately 30%.  It has beneficial effects on lipid levels.  Side effects include diarrhea, abdominal cramping, fecal incontinence, oily spotting, and flatus with discharge.  Side effects are minimized if the patient limits their dietary fat to no more than 30% of their diet.  Patients must take a multivitamin daily to avoid vitamin D, E, A, and K deficiency.  $120 per month    CONTRAVE (Naltrexone/Bupropion): Approved in 2014.  It is a combination pill including an opioid receptor blocker and a long-standing antidepressant.  Most common side effects include nausea, constipation, headache, vomiting, dizziness, trouble sleeping, dry mouth, and diarrhea.  With all antidepressants watch for mood changes and suicide ideation.  Bupropion has been known to lower the seizure threshold in those prone to seizures.  It should not be used in a patient with a recent history of bulimia. It has been  associated with liver damage from taking higher than recommended doses.  Do not use countrave if you have taken opioid medications or opioid street drugs in the past 7-10 days, if you are currently on opioids, methadone, or if you are pregnant.  Do not use contrave if you have recently stopped using alcohol or benzodiazepines.  Taper off contrave slowly.  Dosing: titrate up to 2 tablets twice daily of the Naltrexone 8 mg/ Bupropion 90 mg tablets.  $200 for 90 tablets    SAXENDA (Liraglutide): A daily injectable (3mg daily) medication used for type 2 diabetes (Victoza). Glucagon-like peptide-1 (GLP-1) agonist. Contraindications include personal or family history of medullary thyroid cancer or MEN type 2. Acute pancreatitis has been observed in patients taking liraglutide. Liraglutide causes C-cell tumors in rats and mice. It is unknown whether liraglutide causes tumors in humans. Start at 0.6mg, increasing the dose weekly up to 3mg.     TRULICITY (Dulaglutide): A weekly injectable (4.5mg weekly) medication used for type 2 diabetes. Glucagon-like peptide-1(GLP-1) agonist. Contraindications include personal or family history of medullary thyroid cancer or MEN type 2. Acute pancreatitis has been observed in patients taking liraglutide. Dulaglutide causes C-cell tumors in rats and mice. It is unknown whether liraglutide causes tumors in humans. Start at 0.75 mg, 1.5 mg, 3.0 mg, to 4.5 mg increasing the dose monthly.      VYVANSE (Lisdexamfetamine dimesylate): a CNS stimulant used to treat ADHD. Indicated for the treatment of moderate to severe Binge Eating Disorder in Adults. Contraindicated in patients with known heart disease, structural abnormalities of the heart, serious heart arrhythmias or unexplained syncope. CNS stimulants such as vyvanse may cause manic or psychotic symptoms in patients with BPAD or pre-existing psychosis. Use with caution in patients with Raynaud's phenomenon. Most common side effects include  dry mouth, insomnia, decreased appetite, increased heart rate, jittery feeling, constipation and anxiety.     WEGOVY (Semaglutide): A weekly injectable (2.4mg weekly) medication used for type 2 diabetes (Ozempic). Glucagon-like peptide-1 (GLP-1) agonist. Contraindications include personal or family history of medullary thyroid cancer or MEN type 2. Acute pancreatitis has been observed in patients taking Semaglutide. Semaglutide causes C-cell tumors in rats and mice. It is unknown whether Semaglutide causes tumors in humans. Start at 0.25mg, increasing to 0.5, 1.0, 1.7 then 2.4 monthly.     MOUNJARO (Tirzepatide): A weekly injectable medication indicated for type 2 diabetes. Glucagon-like-peptide-1 (GLP-1) agonist and Glucose-Dependent Insulinotropic Polypeptide (GIP) agonist. Contraindications include personal or family history of medullary thyroid cancer or MEN type 2. Acute pancreatitis has been observed in patients taking Tirzepatide. Tirzepatide causes C-cell tumors in rats and mice. It is unknown whether Tirzepatide causes tumors in humans. Watch for neck mass, difficulty swallowing, persistent hoarseness, and epigastric abdominal pain. Most common side effects include nausea, diarrhea, vomiting, constipation, dyspepsia, and abdominal pain. Start at 2.5mg, increasing to 5.0, 7.5, 10, 12.5 then 15mg monthly.          Welcome to [facility]!      We are grateful that you have chosen us to partner with you on your journey to better health!  We have included some very important information for you to read as you begin your journey towards weight loss surgery. We will discuss parts of this as you move closer to surgery but please reach out if you have any questions or concerns.      Please click on the following links and they will lead you to a printable version of each handout or copy into your browser to view/print at home:    Making Your Decision, Understanding Weight Loss  Surgery  [email or web address]    A Roadmap to Your Weight Loss Surgery  [email or web address]    Honoring Choices - Your Rights  [email or web address]    Honoring Choices - [place] Health Care Directive (form that can be filled out)  [email or web address]      Insurance Coverage and Approval for Surgery  Every insurance plan is different.  Many companies approve benefits for surgery, but many have specific requirements that must be completed prior to being approved.    Verification of benefits is the patient's responsibility.  You will be responsible for any charges not covered by your insurance plan - including, but not limited to copays, deductible, out of pocket, any amount over your cap limit, etc.  Using the guideline below, please contact your insurance plan (we recommend you call the Customer Service number on the back of your card).      Once all of the requirements for surgery are complete, you will see the surgeon.  Following this visit, we will submit your information to your insurance plan for Prior Authorization.  We strongly encourage you to submit any documentation that supports your weight loss attempts to us.    Questions that are important to ask your insurance company when you call them:    Are [facility] in my network?  Is bariatric surgery a covered benefit under my policy?  If so, what is my estimated out of pocket expense?  Are there any exclusions or cap limits on my plan for bariatric surgery?  If so, what are they?  What are the criteria necessary to be approved for bariatric surgery?  Do you require medically supervised weight loss visits for approval of surgery?  If yes, for how many months?  Within the last # of years?  Are the following procedures a covered benefit under my plan?  Laparoscopic Gastric Bypass (CPT Code 53204)  Laparoscopic Sleeve Gastrectomy (CPT Code 42492)  Nutrition/Dietitian Consult (CPT Code  18537  Nutrition/Dietitian Reassessment (CPT Code 64334  Psychological Assessment (CPT Codes 42176 & 52489      Initial labs for Bariatric Surgery    Some lab orders were placed by your doctor in the [facility] system and can be drawn at any of our outpatient hospital labs or your clinic. If you do them at one of three hospitals ([facility] in [place], [facility] in [place], [facility] in [place]) you do not need an appointment but if you'd like to do them at a clinic, you will need to schedule an appointment with that clinic.       You will need to be fasting 10-12 hours prior (you can continue to drink water) and should have them drawn sooner verses later so if any corrections need to be made we will have time to address them.      [facility] is open 7 am - 4:30 pm Monday through Friday and 9am-12:30 pm on Saturdays.  [facility] lab is open 7 am -4:30 pm Monday through Friday and 8am to 11:30am on Saturday and Sundays.     If you would like them done at a clinic outside the [facility] system, then we will need to know where to fax the orders.   If you have any questions or would like help scheduling a lab appointment at the [facility], please give us a call on the Bariatric Nurse Line at [phone].        [place] [facility]  Eat 3 nutrient-rich meals each day     Don't skip meals--it will cause you to overeat later in the day! Space meals 4-6 hours apart    Eat around the same times each day to develop a routine eating schedule    Avoid snacking unless physically hungry.   Planned snacks: 1-2 times per day and no more than 150 calories    Eating protein and fiber (vegetables/fruits/whole grains) with meals helps you stay full     Choose foods with less than 10 grams of sugar and 5-10 grams of fat per serving to prevent excess calories and weight gain  Eat protein first    Protein helps with healing, maintaining muscle mass and good nutrition, and  reducing hunger     For RNY Gastric Bypass, Sleeve Gastrectomy, and Duodenal Switch: aim for 60-80 grams of protein per day    Eat protein first, then veggies and the fruits or grains  Stop drinking 15-30 minutes before meals and wait 30-45 minutes after eating to drink    Drinking too soon before a meal may cause you to be too full to eat    Drinking too soon after you eat will cause the food to  wash  through your new stomach too quickly--leaving you hungry and likely to eat more    Eat S-[id number]    Take 20-30 minutes to eat each meal by taking small bites, chewing foods to applesauce consistency or 20-30 times before you swallow    Not chewing food properly can block the opening of your pouch--causing pain, nausea, vomiting    Eating foods too fast can delay those full signals and increase overeating   Slow down your eating by smaller plates/bowls, toddler utensils, putting your fork/spoon down between bites and not watching TV/computer during meals!  Make a list of activities to prevent boredom, stress and emotional eating    Go for a walk or lift weights while watching your favorite TV show    Talk to a co-worker or email/call a friend     Keep your hands and mind busy with woodworking, puzzles, knitting or painting your nails    Practice deep breathing or meditation  Drink 64 ounces of 0-Calorie drinks between meals     Water    Zero calorie Propel , Vitamin Water Zero  or [employer] Lif[employer]ater , Crystal Light , Sugar-Free Armando-Aid , and other sugar-free lemonade or flavored zimmerman    Decaffeinated, unsweetened coffee/ tea (1 cup or less per day)   Avoid Caffeine and Carbonation- NO STRAWS    Caffeine can irritate your new pouch, increase risk for ulcers, and can increase your appetite      Carbonation can lead to increased bloating/gas and discomfort   Work up to a total of 30-60 minutes of physical activity most days of the week    Helps with losing weight and preventing weight regain after surgery     Do a  combination of cardio (walking/water exercises/biking/swimming) and strength training  Take daily vitamin/mineral supplements after surgery    Daily use of vitamins/minerals is necessary for the rest of your life      Psychological Evaluation for Bariatric Surgery      Why do I need a psychological evaluation before bariatric surgery?     The psychologist's main purpose is to provide the support and education to ensure you have the most successful outcome after surgery.   Preparing for bariatric surgery often involves changing behavior patterns. Working on these changes before surgery allows you to achieve the best results after surgery as some of these behaviors have been entrenched for many years. In addition, your relationship with food may need to change. Psychologists are experts in behavior change and are there to guide and support you as you make these important changes.   A significant life change, like losing a lot of weight, may affect many areas of your life--self-concept, physical activity and relationships with others. Your psychologist will help you prepare to adjust to these changes in a healthy way.   If you have mental health symptoms, relationship struggles, or other challenges, your psychologist will suggest how to best address these concerns so that they do not interfere with your progress toward a healthier lifestyle.       Is the psychologist looking for reasons to say I can't have surgery?   The goal is to not find specific psychological problems. Instead, the psychologist will be working with your strengths to ensure you have the most optimal outcome after surgery.  There is no expectation that you will have everything figured out already or that you must have  perfect  behaviors before moving forward with surgery. Your psychologist is expecting that you will have some behavior changes that will need to occur concurrent with the surgery.   You can feel comfortable that the psychologist is not  there to    you or your habits. The psychologist is there to provide support and encourage your progress.      What should I expect during the evaluation?   During the interview, which could take place over 2 or more sessions, the psychologist will ask about:   -weight history, current eating pattern and fluid intake, and previous attempts at weight loss   -activity level   -psychiatric history  -drug, alcohol and nicotine use   -social and developmental history  -support system   -current stressors and coping mechanisms   -understanding of the risks of surgery   -expectations for outcomes after surgery   You will complete various questionnaires that will focus on coping strategies, eating behaviors, quality of life and adverse childhood experiences in order to provide additional information to inform the assessment.   Your psychologist will likely give you some  homework assignments  to practice as you prepare for surgery. This will be individually tailored to your specific needs.   Your psychologist will talk with you about some of the typical challenges that patients might encounter after surgery and how to prepare for these.   A final report will be generated and any treatment recommendations will be discussed with you and the bariatric team to determine next steps.   If you would like, you may have any support people (e.g., spouse) join a session of the evaluation to provide additional information.       Bariatric surgery offers a physical  tool  for weight management. Similarly, your work with the psychologist will provide you with some additional emotional and behavioral  tools  as you work toward your healthier lifestyle goals.      Support Group    Support and accountability is an important part of your weight loss journey and maintenance once you reach your health goals.  Because of this, we require that you attend at least one of our support groups before you meet with the surgeon so you get a feel  for what they are like and hopefully establish a connection to others who are going through a similar process or have had surgery before so you can ask your questions.    We currently have two options for support group but they are in the virtual format only at this time.  Both groups are using Microsoft Teams for their platform and you can access it through the web or an gill that can be downloaded to a smart phone if you have one.      The Pre- and Post-op Connections Group is on the second Tuesday of the month from 6:30-8pm and is hosted by Rodolfo Dickson, PhD.  If you are interested in this group, you will need to email him at [email or web address] each month and then he will in turn send you the invitation to join.      We also have a Post-op focused Connections Group the 4th Wednesday of the month from 11am-12pm that is mostly geared toward post-operative patients who are past three months post-op.  This group is hosted by WAYLON Poole, DAWNA, SOUMYA and you can email her to join the group at [email or web address] each month and she will send you an invite similar to Rodolfo's group.  If you decide you would like to be a regular attender at this group, we can add you to an automatic invitation list of people.    You can see more information about available support groups that the [facility] offers to our patients as well by following the link:    The following Support Group information can also be found on our website:  [email or web address]      Please let us know if you have any questions about all the information above and we will be talking more about this during future visits.     Thank you,   [facility]

## 2023-03-30 ENCOUNTER — VIRTUAL VISIT (OUTPATIENT)
Dept: SURGERY | Facility: CLINIC | Age: 43
End: 2023-03-30
Payer: COMMERCIAL

## 2023-03-30 VITALS — BODY MASS INDEX: 45.1 KG/M2 | HEIGHT: 70 IN | WEIGHT: 315 LBS

## 2023-03-30 DIAGNOSIS — E66.01 MORBID OBESITY (H): ICD-10-CM

## 2023-03-30 DIAGNOSIS — E78.6 LOW HDL (UNDER 40): Primary | ICD-10-CM

## 2023-03-30 DIAGNOSIS — R63.8 ABNORMAL CRAVING: ICD-10-CM

## 2023-03-30 PROCEDURE — 99215 OFFICE O/P EST HI 40 MIN: CPT | Mod: VID | Performed by: FAMILY MEDICINE

## 2023-03-30 RX ORDER — SEMAGLUTIDE 0.25 MG/.5ML
0.25 INJECTION, SOLUTION SUBCUTANEOUS WEEKLY
Qty: 2 ML | Refills: 0 | Status: SHIPPED | OUTPATIENT
Start: 2023-03-30 | End: 2023-04-27

## 2023-03-30 RX ORDER — PHENTERMINE HYDROCHLORIDE 37.5 MG/1
18.75-37.5 TABLET ORAL
Qty: 90 TABLET | Refills: 1 | Status: SHIPPED | OUTPATIENT
Start: 2023-03-30 | End: 2023-06-28

## 2023-03-30 RX ORDER — NALTREXONE HYDROCHLORIDE 50 MG/1
TABLET, FILM COATED ORAL
Qty: 45 TABLET | Refills: 3 | Status: SHIPPED | OUTPATIENT
Start: 2023-03-30 | End: 2023-09-18
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
## 2023-04-20 ENCOUNTER — VIRTUAL VISIT (OUTPATIENT)
Dept: SURGERY | Facility: CLINIC | Age: 43
End: 2023-04-20
Payer: COMMERCIAL

## 2023-04-20 DIAGNOSIS — E66.01 OBESITY, CLASS III, BMI 40-49.9 (MORBID OBESITY) (H): Primary | ICD-10-CM

## 2023-04-20 PROCEDURE — 97803 MED NUTRITION INDIV SUBSEQ: CPT | Mod: VID

## 2023-04-20 NOTE — LETTER
4/20/2023         RE: Oleg Daley  [address]  [place] [address]        Dear Colleague,    Thank you for referring your patient, Oleg Daley, to the [facility] SURGERY CLINIC AND BARIATRICS CARE [place]. Please see a copy of my visit note below.    Oleg Daley is a 42 year old who is being evaluated via a billable video visit.      How would you like to obtain your AVS? MyChart  If the video visit is dropped, the invitation should be resent by: Text to cell phone: [phone]  Will anyone else be joining your video visit? No :[id number]}        Follow Up Surgical Weight Loss Supervised Diet Evaluation      Assessment:  Pt. is being seen today for a follow up RD nutritional evaluation. Pt. has been unsuccessful with non-surgical weight loss methods and is interested in bariatric surgery. Today we reviewed current eating habits and level of physical activity, and instructed on the changes that are required for successful bariatric outcomes.    Currently taking: Wellbutrin, naltrexone, phentermine    Surgery of interest per pt: Not decided yet.    Workflow review:  Support Group: Not completed.  Psychology:Not completed.  Lab work:Not completed.  SWL:Yes 4/6      Weight goal: At or below initial.    Anthropometrics:  Pt's weight is 324 lbs  Initial weight: 335 lbs  Weight change: down 11 lbs   BMI:  47.1 kg/m2  Patient weight not recorded  Estimated RMR ([place]-St Jeor equation):  2280 kcals x 1.2 (sedentary) = 2730 kcals (for weight maintenance)      Medical History:  Patient Active Problem List   Diagnosis     Morbid obesity with body mass index (BMI) of 40.0 to 49.9 (H)     Allergic rhinitis     Attention deficit disorder of adult with hyperactivity     Mixed anxiety depressive disorder     Restless legs syndrome     Mechanical breakdown of internal fixation device of bone of foot, initial encounter (H)     Elevated LDL cholesterol level     Low HDL (under 40)      Diabetes:  No  HbA1c:  No results found for: HGBA1C    Progress over past month: Patient started phentermine and naltrexone. Has noticed a significant lack of cravings in the evening time. doesn not snack int he evening. Has cut energy drinks down by over 75%. May only have 1-2 per week. Tracking calorie intake, eating ~2,000 calories per day using Myftness pal gill.     +set a goal for 5k steps every day.         Diet Recall/Time  Breakfast 7-8am: eggs (boiled) with small amount of butter or oatmeal with small amount of brown sugar and protein shake or grain cereal    Lunch: ham and cheese wrap OR protein shake OR Salads with chicken with cheese light dressing     Dinner: chicken and/or fish occasionally steak with vegetables potatoes     Typical Snacks: protein shake, yogurt with granola     Eating out: <1x per week     Meal duration:  30 minutes.      fluids by 30 minutes before, during meal, and waiting 30 minutes after meal before drinking fluids: Yes    Beverages  Coffee (maybe 1 cup every other day), water or milk 2% with protein shake, Milk 1%, water,  Energy drink (1-2/ week)    Exercise  Moving around more at work    PES statement:   (NI-1.3) Excessive energy intake related to Food and nutrition related knowledge deficit concerning excessive energy/oral intake as evidenced by Intake of high caloric density foods/beverages (juice, soda, alcohol) at meals and/or snacks; large portions; frequent grazing; estimated intake that exceeds estimated daily energy intake; binge eating patterns; frequent fast food or restaurant intake; and BMI 47.1 kg/m2     Intervention    Nutrition Education:   1. Provided general overview of diet and lifestyle modifications needed to be a deemed a safe candidate for bariatric surgery.   2. Educated patient on how to read a food label: choosing foods with than 10 grams fat and 10 grams sugar per serving to avoid dumping syndrome.  3. Dumping Syndrome: Described the mechanisms of  syndrome, symptoms, and prevention tools from a dietary perspective.   4. Vitamins: Educated on post-op vitamin regimen including MVI+ 18 mg Fe two times a day, calcium citrate 400-600 mg two times a day, 5555-4829 mcg sublingual B12 daily, 5000 IU vitamin D3 daily.     Food/Nutrient Delivery:  5. Educated patient on eating three meals, with cutting out snacking.  6. Bariatric Plate: Patient and I discussed the importance of including a lean protein source (20-30 grams/meal), vegetables (included at lunch and dinner), one serving (15g) of carbohydrate, and limited added fat (1 tb/day) at each meal.   7. Educated patient on how to complete a food journal and benefits of meal planning.   8. Educated patient on using a protein powder drink as a meal replacement and/or supplement after bariatric surgery.   9. Discussed importance of adequate hydration after surgery, with goal of at least 64 oz of fluids/day.  10. Addressed avoiding all carbonated, caffeinated and sweetened drinks to prepare for bariatric surgery.     Nutrition Counselin. Mindful eating techniques: Encouraged slow meal pace, chewing foods to applesauce consistency for 20-30 minutes/meal.   12. Discussed  fluids 30 minutes before, during, and after meal to prevent dumping syndrome and discomfort post bariatric surgery.   13. Discussed pre/post operative diet progression, post op vitamin regimen, gave review of surgery process.     Instructions/Goals:     1. Continue implementing bariatric surgery lifestyle modifications.  2. Fill out food journal and return at follow up.      Monitor/Evaluation:  Pt. s target weight: no gain from initial visit, pt. verbalized understanding.       Plan for next visit:   Continue implementing the bariatric diet guidelines.   tracking calorie intake aiming for ~2,000kcal per day   Wean completely off of caffeine         Video-Visit Details    Type of service:  Video Visit    Video Start Time (time video  started): 1:30 pm    Video End Time (time video stopped): 1:50 pm    Originating Location (pt. Location): Home        Distant Location (provider location):  Off-site    Mode of Communication:  Video Conference via [facility]    Physician has received verbal consent for a Video Visit from the patient? Yes      Cira Tay RD          Again, thank you for allowing me to participate in the care of your patient.        Sincerely,        Cira Tay RD
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
## 2023-04-24 ENCOUNTER — VIRTUAL VISIT (OUTPATIENT)
Dept: SURGERY | Facility: CLINIC | Age: 43
End: 2023-04-24
Payer: COMMERCIAL

## 2023-04-24 DIAGNOSIS — E66.01 MORBID OBESITY WITH BODY MASS INDEX (BMI) OF 40.0 TO 49.9 (H): Primary | ICD-10-CM
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
## 2023-05-01 ENCOUNTER — VIRTUAL VISIT (OUTPATIENT)
Dept: SURGERY | Facility: CLINIC | Age: 43
End: 2023-05-01
Payer: COMMERCIAL

## 2023-05-01 DIAGNOSIS — E66.01 MORBID OBESITY WITH BODY MASS INDEX (BMI) OF 40.0 TO 49.9 (H): Primary | ICD-10-CM

## 2023-05-01 NOTE — PROGRESS NOTES
Virtual Visit Details    Type of service:  Video Visit     Originating Location (pt. Location): Home  Distant Location (provider location):  Off-site  Platform used for Video Visit: Zoom (Sendio)    Start Time: 10 AM  End Time: 10:38 AM    Recommendations: This patient is not 100% certain he wants to have bariatric surgery and wants to start with the non-surgical route while taking medications. He is also medicated for depression and has been vaping with nicotine. He will continue to assess his desire for surgery as he proceeds through the program. The final decision to follow through with bariatric surgery should be done in collaboration with [facility] staff.    Current Treatment Plan and Aftercare Plan: This patient will continue in the non-surgical weight management program meeting with the dietitian and bariatrician to maintain medication management as well as appropriate eating behaviors. He will also attend support group meetings to improve knowledge of surgery and support around surgery. He will maintain mindful eating strategies including documenting his eating, measuring his food, planning out his meals and increasing his activity level. He will maintain medication management to promote mood stability.

## 2023-05-08 DIAGNOSIS — F33.42 RECURRENT MAJOR DEPRESSIVE DISORDER, IN FULL REMISSION (H): ICD-10-CM

## 2023-05-08 NOTE — TELEPHONE ENCOUNTER
"    Last office visit: 11/2/2022   PHQ-9 score:      11/2/2022     7:43 AM   PHQ   PHQ-9 Total Score 7   Q9: Thoughts of better off dead/self-harm past 2 weeks Not at all               Future Appointments 5/8/2023 - 11/4/2023      Date Visit Type Length Department Provider     5/17/2023 10:30 AM RETURN BARIATRIC 30 min [facility] Ching John MD    Location Instructions:     Our clinic is located in the [facility] located at [address] [place] [address], [address], [place], across the street from [facility].              6/13/2023 11:30 AM RETURN BARIATRIC NUTRITION 30 min [facility] Cira Tay RD    Location Instructions:     Our clinic is located in the [facility] located at [address] [place] [address], [address], across the street from [facility].                   Requested Prescriptions   Pending Prescriptions Disp Refills     buPROPion (WELLBUTRIN XL) 150 MG 24 hr tablet [Pharmacy Med Name: BUPROPION HCL  MG TABLET] 90 tablet 1     Sig: TAKE 1 TABLET BY MOUTH EVERY MORNING       SSRIs Protocol Failed - 5/8/2023  1:07 AM        Failed - PHQ-9 score less than 5 in past 6 months     Please review last PHQ-9 score.           Passed - Medication is Bupropion     If the medication is Bupropion (Wellbutrin), and the patient is taking for smoking cessation; OK to refill.          Passed - Medication is active on med list        Passed - Patient is age 18 or older        Passed - Recent (6 mo) or future (30 days) visit within the authorizing provider's specialty     Patient had office visit in the last 6 months or has a visit in the next 30 days with authorizing provider or within the authorizing provider's specialty.  See \"Patient Info\" tab in inbasket, or \"Choose Columns\" in Meds & Orders section of the refill encounter.                       "

## 2023-05-09 RX ORDER — BUPROPION HYDROCHLORIDE 150 MG/1
150 TABLET ORAL EVERY MORNING
Qty: 30 TABLET | Refills: 0 | Status: SHIPPED | OUTPATIENT
Start: 2023-05-09 | End: 2023-06-13

## 2023-05-14 ENCOUNTER — HEALTH MAINTENANCE LETTER (OUTPATIENT)
Age: 43
End: 2023-05-14

## 2023-06-13 DIAGNOSIS — F33.42 RECURRENT MAJOR DEPRESSIVE DISORDER, IN FULL REMISSION (H): ICD-10-CM

## 2023-06-13 RX ORDER — BUPROPION HYDROCHLORIDE 150 MG/1
150 TABLET ORAL EVERY MORNING
Qty: 30 TABLET | Refills: 0 | Status: SHIPPED | OUTPATIENT
Start: 2023-06-13 | End: 2023-08-02

## 2023-06-13 NOTE — TELEPHONE ENCOUNTER
Routing refill request to provider for review/approval because:  PHQ9 not current:        11/2/2022     7:43 AM   PHQ   PHQ-9 Total Score 7   Q9: Thoughts of better off dead/self-harm past 2 weeks Not at all     Patient needs to be seen because it has been more than 6 months since last office visit.    Also routing to Clinic Pool. Please reach out and schedule patient.    Amy Seals RN  [facility]

## 2023-08-02 ENCOUNTER — MYC REFILL (OUTPATIENT)
Dept: FAMILY MEDICINE | Facility: CLINIC | Age: 43
End: 2023-08-02

## 2023-08-02 DIAGNOSIS — F33.42 RECURRENT MAJOR DEPRESSIVE DISORDER, IN FULL REMISSION (H): ICD-10-CM

## 2023-08-02 RX ORDER — BUPROPION HYDROCHLORIDE 150 MG/1
150 TABLET ORAL EVERY MORNING
Qty: 30 TABLET | Refills: 0 | Status: SHIPPED | OUTPATIENT
Start: 2023-08-02 | End: 2023-09-18

## 2023-08-02 NOTE — TELEPHONE ENCOUNTER
Routing refill request to provider for review/approval because:  LOV 11/02/2022    Care team please reach out to patient to schedule appointment       SSRIs Protocol Failed    PHQ-9 score less than 5 in past 6 months    Recent (6 mo) or future (30 days) visit within the authorizing provider's specialty           MARIBEL Mcnally  [employer]

## 2023-09-18 ENCOUNTER — VIRTUAL VISIT (OUTPATIENT)
Dept: FAMILY MEDICINE | Facility: CLINIC | Age: 43
End: 2023-09-18
Payer: COMMERCIAL

## 2023-09-18 DIAGNOSIS — Z71.89 ACP (ADVANCE CARE PLANNING): ICD-10-CM

## 2023-09-18 DIAGNOSIS — E66.01 MORBID OBESITY WITH BODY MASS INDEX (BMI) OF 40.0 TO 49.9 (H): Primary | ICD-10-CM

## 2023-09-18 DIAGNOSIS — Z71.6 ENCOUNTER FOR TOBACCO USE CESSATION COUNSELING: ICD-10-CM

## 2023-09-18 DIAGNOSIS — R63.8 ABNORMAL CRAVING: ICD-10-CM

## 2023-09-18 DIAGNOSIS — F33.42 RECURRENT MAJOR DEPRESSIVE DISORDER, IN FULL REMISSION (H): ICD-10-CM

## 2023-09-18 PROCEDURE — 99215 OFFICE O/P EST HI 40 MIN: CPT | Mod: VID | Performed by: FAMILY MEDICINE

## 2023-09-18 RX ORDER — PHENTERMINE HYDROCHLORIDE 37.5 MG/1
TABLET ORAL
COMMUNITY
Start: 2023-07-15 | End: 2023-09-18

## 2023-09-18 RX ORDER — PHENTERMINE HYDROCHLORIDE 37.5 MG/1
TABLET ORAL
Qty: 30 TABLET | Refills: 3 | Status: SHIPPED | OUTPATIENT
Start: 2023-09-18 | End: 2023-11-24

## 2023-09-18 RX ORDER — BUPROPION HYDROCHLORIDE 150 MG/1
150 TABLET ORAL EVERY MORNING
Qty: 90 TABLET | Refills: 1 | Status: SHIPPED | OUTPATIENT
Start: 2023-09-18 | End: 2023-11-24

## 2023-09-18 RX ORDER — NALTREXONE HYDROCHLORIDE 50 MG/1
TABLET, FILM COATED ORAL
Qty: 45 TABLET | Refills: 1 | Status: SHIPPED | OUTPATIENT
Start: 2023-09-18 | End: 2023-11-24

## 2023-09-18 ASSESSMENT — PATIENT HEALTH QUESTIONNAIRE - PHQ9
SUM OF ALL RESPONSES TO PHQ QUESTIONS 1-9: 5
SUM OF ALL RESPONSES TO PHQ QUESTIONS 1-9: 5
10. IF YOU CHECKED OFF ANY PROBLEMS, HOW DIFFICULT HAVE THESE PROBLEMS MADE IT FOR YOU TO DO YOUR WORK, TAKE CARE OF THINGS AT HOME, OR GET ALONG WITH OTHER PEOPLE: NOT DIFFICULT AT ALL

## 2023-09-18 NOTE — PATIENT INSTRUCTIONS
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    Please schedule a nurse visit to get your weight checked in about 3 months.

## 2023-09-18 NOTE — PROGRESS NOTES
Sami is a 43 year old who is being evaluated via a billable video visit.      How would you like to obtain your AVS? Bridgettehart  If the video visit is dropped, the invitation should be resent by: Text to cell phone: [phone]  Will anyone else be joining your video visit? No          Assessment & Plan   Problem List Items Addressed This Visit          Digestive    Morbid obesity with body mass index (BMI) of 40.0 to 49.9 (H) - Primary    Relevant Medications    phentermine (ADIPEX-P) 37.5 MG tablet     Other Visit Diagnoses       Recurrent major depressive disorder, in full remission (H)        Relevant Medications    buPROPion (WELLBUTRIN XL) 150 MG 24 hr tablet    phentermine (ADIPEX-P) 37.5 MG tablet    Abnormal craving        Relevant Medications    phentermine (ADIPEX-P) 37.5 MG tablet    naltrexone (DEPADE/REVIA) 50 MG tablet    Encounter for tobacco use cessation counseling        ACP (advance care planning)               Morbid obesity, phentermine and wellbutrin  have helped get weight down now at 316 at home, max weight was 340, cont phentermine and TLC    Evening crqavings, pt uses naltrexone prn working late and seems to be effective    Would like pt to get weight checked in 3 months here in [facility], we should have a follow up in 6 months, as long as he continues to have success with weight loss then we will hold on bariatric follow up for now    Encouraged nicotine cessation    Depression in remission, recently lost coworker to suicide, now having to cover all weekends and has to get the company moved to a new location, handling the stress well    Acp done today    Father in law has been moved to nursing home today, much relief in the whole house.      Total time spent reviewing chart and preparing for appointment, with patient for appointment, and time spent charting and coordinating care on the day of the appointment in minutes was:  40      11/2/2022     7:43 AM 9/18/2023     3:57 PM   PHQ   PHQ-9  "Total Score 7 5   Q9: Thoughts of better off dead/self-harm past 2 weeks Not at all Not at all               BMI:   Estimated body mass index is 48.03 kg/m  as calculated from the following:    Height as of 3/30/23: 1.765 m (5' 9.5\").    Weight as of 3/30/23: 149.7 kg (330 lb).   Weight management plan: continue to use phentermine, naltrexone, and wellbutrin        Kassandra Longoria MD  [facility]    Deep Gallardo is a 43 year old, presenting for the following health issues:  Recheck Medication      History of Present Illness       Mental Health Follow-up:  Patient presents to follow-up on Depression.Patient's depression since last visit has been:  Good  The patient is not having other symptoms associated with depression.      Any significant life events: financial concerns and grief or loss  Patient is not feeling anxious or having panic attacks.  Patient has no concerns about alcohol or drug use.    He eats 0-1 servings of fruits and vegetables daily.He consumes 2 sweetened beverage(s) daily.He exercises with enough effort to increase his heart rate 9 or less minutes per day.  He exercises with enough effort to increase his heart rate 3 or less days per week.   He is taking medications regularly.               Review of Systems         Objective    Vitals - Patient Reported  Weight (Patient Reported): 143.3 kg (316 lb)      Vitals:  No vitals were obtained today due to virtual visit.    Physical Exam   GENERAL: Healthy, alert and no distress  EYES: Eyes grossly normal to inspection.  No discharge or erythema, or obvious scleral/conjunctival abnormalities.  RESP: No audible wheeze, cough, or visible cyanosis.  No visible retractions or increased work of breathing.    SKIN: Visible skin clear. No significant rash, abnormal pigmentation or lesions.  NEURO: Cranial nerves grossly intact.  Mentation and speech appropriate for age.  PSYCH: Mentation appears normal, affect " normal/bright, judgement and insight intact, normal speech and appearance well-groomed.                Video-Visit Details    Type of service:  Video Visit     Originating Location (pt. Location): Home    Distant Location (provider location):  On-site  Platform used for Video Visit: Christopher

## 2023-09-18 NOTE — LETTER
My Depression Action Plan  Name: Oleg Daley   Date of Birth 1980  Date: 9/18/2023    My doctor: Kassandra Longoria   My clinic: [address] [address] [phone]            GREEN    ZONE   Good Control    What it looks like:   Things are going generally well. You have normal ups and downs. You may even feel depressed from time to time, but bad moods usually last less than a day.   What you need to do:  Continue to care for yourself (see self care plan)  Check your depression survival kit and update it as needed  Follow your physician s recommendations including any medication.  Do not stop taking medication unless you consult with your physician first.             YELLOW         ZONE Getting Worse    What it looks like:   Depression is starting to interfere with your life.   It may be hard to get out of bed; you may be starting to isolate yourself from others.  Symptoms of depression are starting to last most all day and this has happened for several days.   You may have suicidal thoughts but they are not constant.   What you need to do:     Call your care team. Your response to treatment will improve if you keep your care team informed of your progress. Yellow periods are signs an adjustment may need to be made.     Continue your self-care.  Just get dressed and ready for the day.  Don't give yourself time to talk yourself out of it.    Talk to someone in your support network.    Open up your Depression Self-Care Plan/Wellness Kit.             RED    ZONE Medical Alert - Get Help    What it looks like:   Depression is seriously interfering with your life.   You may experience these or other symptoms: You can t get out of bed most days, can t work or engage in other necessary activities, you have trouble taking care of basic hygiene, or basic responsibilities, thoughts of suicide or death that will not go away,  self-injurious behavior.     What you need to do:  Call your care team and request a same-day appointment. If they are not available (weekends or after hours) call your local crisis line, emergency room or 911.          Depression Self-Care Plan / Wellness Kit    Many people find that medication and therapy are helpful treatments for managing depression. In addition, making small changes to your everyday life can help to boost your mood and improve your wellbeing. Below are some tips for you to consider. Be sure to talk with your medical provider and/or behavioral health consultant if your symptoms are worsening or not improving.     Sleep   Sleep hygiene  means all of the habits that support good, restful sleep. It includes maintaining a consistent bedtime and wake time, using your bedroom only for sleeping or sex, and keeping the bedroom dark and free of distractions like a computer, smartphone, or television.     Develop a Healthy Routine  Maintain good hygiene. Get out of bed in the morning, make your bed, brush your teeth, take a shower, and get dressed. Don t spend too much time viewing media that makes you feel stressed. Find time to relax each day.    Exercise  Get some form of exercise every day. This will help reduce pain and release endorphins, the  feel good  chemicals in your brain. It can be as simple as just going for a walk or doing some gardening, anything that will get you moving.      Diet  Strive to eat healthy foods, including fruits and vegetables. Drink plenty of water. Avoid excessive sugar, caffeine, alcohol, and other mood-altering substances.     Stay Connected with Others  Stay in touch with friends and family members.    Manage Your Mood  Try deep breathing, massage therapy, biofeedback, or meditation. Take part in fun activities when you can. Try to find something to smile about each day.     Psychotherapy  Be open to working with a therapist if your provider recommends it.      Medication  Be sure to take your medication as prescribed. Most anti-depressants need to be taken every day. It usually takes several weeks for medications to work. Not all medicines work for all people. It is important to follow-up with your provider to make sure you have a treatment plan that is working for you. Do not stop your medication abruptly without first discussing it with your provider.    Crisis Resources   These hotlines are for both adults and children. They and are open 24 hours a day, 7 days a week unless noted otherwise.    National Suicide Prevention Lifeline   988 or 0-341-805-WOOA (5447)    Crisis Text Line    www.crisistextline.org  Text HOME to 243150 from anywhere in the [place], anytime, about any type of crisis. A live, trained crisis counselor will receive the text and respond quickly.    [facility] Lifeline for LGBTQ Youth  A national crisis intervention and suicide lifeline for LGBTQ youth under 25. Provides a safe place to talk without judgement. Call 1-621.276.7752; text START to 784032 or visit www.thetrevorproject.org to talk to a trained counselor.    For [facility] crisis numbers, visit the [facility] website at:  https://[place].gov/dhs/people-we-serve/adults/health-care/mental-health/resources/crisis-contacts.jsp

## 2023-10-05 ENCOUNTER — MYC MEDICAL ADVICE (OUTPATIENT)
Dept: FAMILY MEDICINE | Facility: CLINIC | Age: 43
End: 2023-10-05
Payer: COMMERCIAL

## 2023-10-05 DIAGNOSIS — F40.232 FEAR OF OTHER MEDICAL CARE: Primary | ICD-10-CM

## 2023-10-05 RX ORDER — LORAZEPAM 0.5 MG/1
0.5 TABLET ORAL ONCE
Qty: 1 TABLET | Refills: 0 | Status: SHIPPED | OUTPATIENT
Start: 2023-10-05 | End: 2023-10-05

## 2023-10-06 ENCOUNTER — TELEPHONE (OUTPATIENT)
Dept: FAMILY MEDICINE | Facility: CLINIC | Age: 43
End: 2023-10-06
Payer: COMMERCIAL

## 2023-10-06 NOTE — TELEPHONE ENCOUNTER
See My Chart Message(s) .    Family member sending messages in personal [employer], instead of patient(s) My Chart.    RX for Ativan sent to [id number] - [place], [place] - [phone]    RN called and Cancelled RX: Confirmed with pharmacy that medication was NOT disp/picked up by patient.

## 2023-10-25 ENCOUNTER — MYC MEDICAL ADVICE (OUTPATIENT)
Dept: FAMILY MEDICINE | Facility: CLINIC | Age: 43
End: 2023-10-25
Payer: COMMERCIAL

## 2023-10-26 ENCOUNTER — E-VISIT (OUTPATIENT)
Dept: FAMILY MEDICINE | Facility: CLINIC | Age: 43
End: 2023-10-26
Payer: COMMERCIAL

## 2023-10-26 DIAGNOSIS — R04.0 BLOODY NOSE: Primary | ICD-10-CM

## 2023-10-26 PROCEDURE — 99421 OL DIG E/M SVC 5-10 MIN: CPT | Performed by: FAMILY MEDICINE

## 2023-10-26 NOTE — PATIENT INSTRUCTIONS
Holland Gallardo,    I am not sure what you want me to do with this E-visit.  Do you want me to place a referral to ear nose and throat?  Do you want me to help you get a clinic appointment?  If you are having a hard time controlling the bleeding from a bloody nose and you think that it needs to be cauterized urgently I would recommend going to an emergency room that has an available ear nose and throat doctor on-call.  I think [facility] might be the closest location but I suggest you call ahead and make sure that they have ear nose and throat available on call before you went there.  Otherwise you could check at [facility] or [facility].  Let me know how else I can help!    Kassandra Longoria MD

## 2023-11-24 ENCOUNTER — MYC REFILL (OUTPATIENT)
Dept: FAMILY MEDICINE | Facility: CLINIC | Age: 43
End: 2023-11-24

## 2023-11-24 ENCOUNTER — OFFICE VISIT (OUTPATIENT)
Dept: FAMILY MEDICINE | Facility: CLINIC | Age: 43
End: 2023-11-24
Payer: COMMERCIAL

## 2023-11-24 ENCOUNTER — TELEPHONE (OUTPATIENT)
Dept: FAMILY MEDICINE | Facility: CLINIC | Age: 43
End: 2023-11-24

## 2023-11-24 VITALS
TEMPERATURE: 96.9 F | HEIGHT: 70 IN | DIASTOLIC BLOOD PRESSURE: 82 MMHG | WEIGHT: 315 LBS | RESPIRATION RATE: 18 BRPM | SYSTOLIC BLOOD PRESSURE: 110 MMHG | BODY MASS INDEX: 45.1 KG/M2

## 2023-11-24 DIAGNOSIS — F33.42 RECURRENT MAJOR DEPRESSIVE DISORDER, IN FULL REMISSION (H): ICD-10-CM

## 2023-11-24 DIAGNOSIS — G50.0 TRIGEMINAL NEURALGIA: Primary | ICD-10-CM

## 2023-11-24 DIAGNOSIS — K76.0 FATTY LIVER: ICD-10-CM

## 2023-11-24 DIAGNOSIS — R63.8 ABNORMAL CRAVING: ICD-10-CM

## 2023-11-24 DIAGNOSIS — Z71.6 ENCOUNTER FOR TOBACCO USE CESSATION COUNSELING: ICD-10-CM

## 2023-11-24 DIAGNOSIS — E66.01 MORBID OBESITY WITH BODY MASS INDEX (BMI) OF 40.0 TO 49.9 (H): ICD-10-CM

## 2023-11-24 PROCEDURE — 99214 OFFICE O/P EST MOD 30 MIN: CPT | Performed by: FAMILY MEDICINE

## 2023-11-24 RX ORDER — GABAPENTIN 100 MG/1
100 CAPSULE ORAL 3 TIMES DAILY
Qty: 270 CAPSULE | Refills: 1 | Status: SHIPPED | OUTPATIENT
Start: 2023-11-24

## 2023-11-24 RX ORDER — PHENTERMINE HYDROCHLORIDE 37.5 MG/1
37.5 TABLET ORAL
Qty: 90 TABLET | Refills: 1 | Status: SHIPPED | OUTPATIENT
Start: 2023-11-24 | End: 2023-11-24

## 2023-11-24 ASSESSMENT — ENCOUNTER SYMPTOMS: HEADACHES: 1

## 2023-11-24 NOTE — TELEPHONE ENCOUNTER
Central Prior Authorization Team   Phone: [phone]    PRIOR AUTHORIZATION DENIED    Medication: PHENTERMINE HCL 37.5 MG PO TABS  Insurance Company: [employer] - Phone [phone] Fax [phone]  Denial Date: 11/24/2023  Denial Rational: COVERAGE IS PROVIDED WHEN PATIENT HAS NOT COMPLETED THREE MONTHS OF THERAPY WITHIN PAST YEAR      Appeal Information: IF PROVIDER WOULD LIKE TO APPEAL THE DECISION PLEASE PROVIDE THE PA TEAM WITH A LETTER OF MEDICAL NECESSITY      Patient Notified: No

## 2023-11-24 NOTE — PROGRESS NOTES
Assessment & Plan   Problem List Items Addressed This Visit       Morbid obesity with body mass index (BMI) of 40.0 to 49.9 (H)    Relevant Medications    phentermine (ADIPEX-P) 37.5 MG tablet     Other Visit Diagnoses       Trigeminal neuralgia    -  Primary    Relevant Medications    gabapentin (NEURONTIN) 100 MG capsule    Other Relevant Orders    Adult Neurology  Referral    Encounter for tobacco use cessation counseling        Fatty liver        Recurrent major depressive disorder, in full remission (H24)        Relevant Medications    phentermine (ADIPEX-P) 37.5 MG tablet         Patient is here today with his mom.  He has been getting almost daily headaches.  They are a pressure over his bilateral forehead area.  He usually does not have them in the morning and then they worsen throughout the day.  Once he has the headache the only thing he can do is [place]orm[place] down.  Sometimes Tylenol or ibuprofen is helpful but not always.    Morbid obesity with fatty liver patient has been taking phentermine.  It has been helping him lose weight.  This has been helpful for his liver profile.  Blood pressure today is normal however at this time he does not have a headache    On exam he has no tenderness over his occipital head or neck muscles or trapezii.  Cranial nerves II through XII are grossly intact and he has no focal neurologic deficits.    Headaches seem to occur over the distribution of the ophthalmic branch of the trigeminal nerve.  They are more of a dull pressure than a stabbing or needlelike pain.    Assessment and plan  Daily headache does not quite fit in the pattern of tension headache or trigeminal neuralgia.  Given his fatty liver would like to avoid drugs that get metabolized significantly by the liver.  Using joint medical decision making we are going to first have patient try to determine if there is a pattern to the headaches related to the effectiveness of his phentermine.  If the headaches  seem to occur when his appetite is being most suppressed then I would like him to check his blood pressure.  Did also like him to check his blood pressure when he is not having the headache but is under the influence of the phentermine to help us determine if the phentermine may be increasing blood pressure and causing the headaches.  We also discussed that headaches can cause pain and when you have pain it can increase your blood pressure.  So he will need to pay attention to this to help us figure out if there is a pattern to this related to blood pressure.  I did renew his phentermine because at this time his blood pressure is normal.  If blood pressure does not seem to be playing a role in headaches then would like him to get started on some gabapentin to see if this will help prevent the headaches.  We will also place a referral to neurology for further evaluation.  Patient can certainly cancel the referral if his symptoms become better controlled.                      Kassandra Longoria MD  [facility]    Deep Gallardo is a 43 year old, presenting for the following health issues:  Recheck Medication and Headache (Pt. Is having bad headaches nearly everyday)        11/24/2023     9:19 AM   Additional Questions   Roomed by TERESE Us   Accompanied by Mom       History of Present Illness       Mental Health Follow-up:  Patient presents to follow-up on Depression.Patient's depression since last visit has been:  Good  The patient is not having other symptoms associated with depression.      Any significant life events: No  Patient is not feeling anxious or having panic attacks.  Patient has no concerns about alcohol or drug use.    Reason for visit:  See you for med refills.    He eats 0-1 servings of fruits and vegetables daily.He consumes 1 sweetened beverage(s) daily.He exercises with enough effort to increase his heart rate 10 to 19 minutes per day.  He exercises with enough  "effort to increase his heart rate 3 or less days per week.   He is taking medications regularly.                 Review of Systems   Neurological:  Positive for headaches.            Objective    /82 (BP Location: Right arm, Patient Position: Sitting, Cuff Size: Adult Large)   Temp 96.9  F (36.1  C) (Tympanic)   Resp 18   Ht 1.765 m (5' 9.5\")   Wt 144.4 kg (318 lb 6.4 oz)   BMI 46.35 kg/m    Body mass index is 46.35 kg/m .  Physical Exam                         "

## 2023-11-27 RX ORDER — PHENTERMINE HYDROCHLORIDE 37.5 MG/1
37.5 TABLET ORAL
Qty: 90 TABLET | Refills: 1 | Status: SHIPPED | OUTPATIENT
Start: 2023-11-27 | End: 2024-01-17

## 2023-11-27 RX ORDER — NALTREXONE HYDROCHLORIDE 50 MG/1
TABLET, FILM COATED ORAL
Qty: 45 TABLET | Refills: 1 | Status: SHIPPED | OUTPATIENT
Start: 2023-11-27

## 2023-11-27 RX ORDER — BUPROPION HYDROCHLORIDE 150 MG/1
150 TABLET ORAL EVERY MORNING
Qty: 90 TABLET | Refills: 1 | Status: SHIPPED | OUTPATIENT
Start: 2023-11-27 | End: 2024-09-18

## 2023-11-28 NOTE — CONFIDENTIAL NOTE
Please let patient know that his insurance company will only cover 12 weeks of phentermine per year.  He may choose to pay for the medication out-of-pocket if he would prefer to.

## 2023-12-04 ENCOUNTER — TELEPHONE (OUTPATIENT)
Dept: FAMILY MEDICINE | Facility: CLINIC | Age: 43
End: 2023-12-04
Payer: COMMERCIAL

## 2023-12-04 NOTE — TELEPHONE ENCOUNTER
Medication Question or Refill    Pharmacy requesting MD to clarify phentermine 37.5 mg tablet instructions and resend to pharmacy.    Please resend to pharmacy with 1 set of instructions, current prescription has instructions for 1 tablet daily  And instruction for 1/2 to 1 tablet daily.    What medication are you calling about (include dose and sig)?: phentermine 37.5 mg tablets    Preferred Pharmacy:   [facility] [address] [place] [address] [address]  [address] [facility] [address]  Phone: [phone] Fax: [phone]      Controlled Substance Agreement on file:   CSA -- Patient Level:    CSA: None found at the patient level.       Who prescribed the medication?: Dr. Kassandra Longoria

## 2024-01-17 ENCOUNTER — MYC REFILL (OUTPATIENT)
Dept: FAMILY MEDICINE | Facility: CLINIC | Age: 44
End: 2024-01-17
Payer: COMMERCIAL

## 2024-01-17 DIAGNOSIS — F33.42 RECURRENT MAJOR DEPRESSIVE DISORDER, IN FULL REMISSION (H): ICD-10-CM

## 2024-01-18 NOTE — TELEPHONE ENCOUNTER
Last office visit: 11/24/2023     Future Appointments 1/18/2024 - 7/16/2024      None            Requested Prescriptions   Pending Prescriptions Disp Refills    phentermine (ADIPEX-P) 37.5 MG tablet 90 tablet 1     Sig: Take 1 tablet (37.5 mg) by mouth every morning (before breakfast) TAKE 1/2 TO 1 TABLET BY MOUTH EVERY MORNING BEFORE BREAKFAST       There is no refill protocol information for this order

## 2024-01-20 RX ORDER — PHENTERMINE HYDROCHLORIDE 37.5 MG/1
37.5 TABLET ORAL
Qty: 90 TABLET | Refills: 0 | Status: SHIPPED | OUTPATIENT
Start: 2024-01-20 | End: 2024-05-03

## 2024-01-27 ENCOUNTER — MYC REFILL (OUTPATIENT)
Dept: FAMILY MEDICINE | Facility: CLINIC | Age: 44
End: 2024-01-27
Payer: COMMERCIAL

## 2024-01-27 DIAGNOSIS — F33.42 RECURRENT MAJOR DEPRESSIVE DISORDER, IN FULL REMISSION (H): ICD-10-CM

## 2024-01-27 RX ORDER — PHENTERMINE HYDROCHLORIDE 37.5 MG/1
37.5 TABLET ORAL
Qty: 90 TABLET | Refills: 0 | Status: CANCELLED | OUTPATIENT
Start: 2024-01-27

## 2024-05-03 DIAGNOSIS — F33.42 RECURRENT MAJOR DEPRESSIVE DISORDER, IN FULL REMISSION (H): ICD-10-CM

## 2024-05-06 ENCOUNTER — TELEPHONE (OUTPATIENT)
Dept: FAMILY MEDICINE | Facility: CLINIC | Age: 44
End: 2024-05-06
Payer: COMMERCIAL

## 2024-05-06 DIAGNOSIS — F33.42 RECURRENT MAJOR DEPRESSIVE DISORDER, IN FULL REMISSION (H): ICD-10-CM

## 2024-05-06 RX ORDER — PHENTERMINE HYDROCHLORIDE 37.5 MG/1
37.5 TABLET ORAL
Qty: 90 TABLET | Refills: 0 | OUTPATIENT
Start: 2024-05-06

## 2024-05-06 RX ORDER — PHENTERMINE HYDROCHLORIDE 37.5 MG/1
37.5 TABLET ORAL
Qty: 90 TABLET | Refills: 1 | Status: SHIPPED | OUTPATIENT
Start: 2024-05-06

## 2024-05-06 NOTE — TELEPHONE ENCOUNTER
"Retail Pharmacy Prior Authorization Team   Phone: [phone]      PRIOR AUTHORIZATION DENIED    Medication: PHENTERMINE HCL 37.5 MG PO TABS  Insurance Company: [employer]'s - Phone [phone] Fax [phone]  Denial Date: 5/6/2024  Denial Reason(s):           Appeal Information:   To send an appeal to the patient's insurance, please provide a letter of medical necessity for the requested medication that was denied.  Please use the denial rationale from the patient's insurance to write the letter.  Once it is completed, please have it available under the \"letters tab\" in the patient's chart and route directly back to me: NOEMY VIRAMONTES and I can send the appeal to the patient's insurance.       Patient Notified:   No. The [employer] PA Team does not notify of the denial outcomes as the patient often will ask what their provider will prescribe in place of the denied medication, or additional information in regards to other therapies they can take in place of the denied medication.  This is not something we can advise on in our department.    "

## 2024-07-17 ENCOUNTER — MYC MEDICAL ADVICE (OUTPATIENT)
Dept: FAMILY MEDICINE | Facility: CLINIC | Age: 44
End: 2024-07-17
Payer: COMMERCIAL

## 2024-07-21 ENCOUNTER — HEALTH MAINTENANCE LETTER (OUTPATIENT)
Age: 44
End: 2024-07-21

## 2024-09-16 DIAGNOSIS — F33.42 RECURRENT MAJOR DEPRESSIVE DISORDER, IN FULL REMISSION (H): ICD-10-CM

## 2024-09-18 RX ORDER — BUPROPION HYDROCHLORIDE 150 MG/1
150 TABLET ORAL EVERY MORNING
Qty: 90 TABLET | Refills: 0 | Status: SHIPPED | OUTPATIENT
Start: 2024-09-18

## 2024-10-24 ENCOUNTER — MYC MEDICAL ADVICE (OUTPATIENT)
Dept: FAMILY MEDICINE | Facility: CLINIC | Age: 44
End: 2024-10-24
Payer: COMMERCIAL

## 2025-01-23 ENCOUNTER — VIRTUAL VISIT (OUTPATIENT)
Dept: FAMILY MEDICINE | Facility: CLINIC | Age: 45
End: 2025-01-23
Payer: COMMERCIAL

## 2025-01-23 DIAGNOSIS — E78.6 LOW HDL (UNDER 40): ICD-10-CM

## 2025-01-23 DIAGNOSIS — R63.8 ABNORMAL CRAVING: ICD-10-CM

## 2025-01-23 DIAGNOSIS — E66.01 MORBID OBESITY WITH BODY MASS INDEX (BMI) OF 40.0 TO 49.9 (H): Primary | ICD-10-CM

## 2025-01-23 DIAGNOSIS — E78.00 ELEVATED LDL CHOLESTEROL LEVEL: ICD-10-CM

## 2025-01-23 RX ORDER — NALTREXONE HYDROCHLORIDE 50 MG/1
TABLET, FILM COATED ORAL
Qty: 15 TABLET | Refills: 1 | Status: SHIPPED | OUTPATIENT
Start: 2025-01-23

## 2025-01-23 RX ORDER — TIRZEPATIDE 2.5 MG/.5ML
2.5 INJECTION, SOLUTION SUBCUTANEOUS
Qty: 2 ML | Refills: 0 | Status: SHIPPED | OUTPATIENT
Start: 2025-01-23

## 2025-01-23 ASSESSMENT — PATIENT HEALTH QUESTIONNAIRE - PHQ9: SUM OF ALL RESPONSES TO PHQ QUESTIONS 1-9: 1

## 2025-01-23 NOTE — PROGRESS NOTES
Oleg is a 44 year old who is being evaluated via a billable video visit.    How would you like to obtain your AVS? MyChart  If the video visit is dropped, the invitation should be resent by: Text to cell phone: [phone]  Will anyone else be joining your video visit? No  {If patient encounters technical issues they should call [phone] :[id number]}    Assessment & Plan   Problem List Items Addressed This Visit       Morbid obesity with body mass index (BMI) of 40.0 to 49.9 (H) - Primary    Relevant Medications    ZEPBOUND 2.5 MG/0.5ML prefilled pen    Elevated LDL cholesterol level    Low HDL (under 40)     Other Visit Diagnoses       Abnormal craving        Relevant Medications    naltrexone (DEPADE/REVIA) 50 MG tablet    ZEPBOUND 2.5 MG/0.5ML prefilled pen           The longitudinal plan of care for the diagnosis(es)/condition(s) as documented were addressed during this visit. Due to the added complexity in care, I will continue to support Oleg in the subsequent management and with ongoing continuity of care.    We have discussed medical treatment options including Contrave, Qsymia, orlistat, topiramate, bupropion, and phentermine.    We also discussed Wegovy, Saxenda, and Zepbound.  They have no history of pancreatitis, no personal history of thyroid cancer, no family history of multiple endocrine neoplasia.  We discussed that the most common side effects are GI side effects, possibly including nausea, vomiting, diarrhea, constipation and increased burping.  Also discussed with patient that there is a known risk in rodents of developing thyroid cancer.  The higher the dose of medicine and the longer the rats are on the medicine with both factors that increase their risk of developing thyroid medullary cancer.  This risk, so far, has not been confirmed in humans.  They would like to proceed with using one of these to assist them with weight loss.             {FOLLOW UP PLANS (Optional) Includes  "COVID19 Treatment Plan:284173}      Deep Dejesus is a 44 year old, presenting for the following health issues:  Weight Loss and Recheck Medication        1/23/2025     3:57 PM   Additional Questions   Roomed by Robert     History of Present Illness       Reason for visit:  Med check   He is taking medications regularly.       {SUPERLIST (Optional):214942}  {additonal problems for provider to add (Optional):880776}    {ROS Picklists (Optional):508902}      Objective    Vitals - Patient Reported  Height (Patient Reported): 180.3 cm (5' 11\")      Vitals:  No vitals were obtained today due to virtual visit.    Physical Exam   GENERAL: alert and no distress  EYES: Eyes grossly normal to inspection.  No discharge or erythema, or obvious scleral/conjunctival abnormalities.  RESP: No audible wheeze, cough, or visible cyanosis.    SKIN: Visible skin clear. No significant rash, abnormal pigmentation or lesions.  NEURO: Cranial nerves grossly intact.  Mentation and speech appropriate for age.  PSYCH: Appropriate affect, tone, and pace of words    {Diagnostic Test Results (Optional):686011}      Video-Visit Details    Type of service:  Video Visit   Originating Location (pt. Location): Home  {PROVIDER LOCATION On-site should be selected for visits conducted from your clinic location or adjoining [facility] hospital, academic office, or other nearby [facility] building. Off-site should be selected for all other provider locations, including home:219710}  Distant Location (provider location):  On-site  Platform used for Video Visit: Tesha  Signed Electronically by: Kassandra Longoria MD  {Email feedback regarding this note to [email or web address]   :100537}  "

## 2025-01-29 ENCOUNTER — DOCUMENTATION ONLY (OUTPATIENT)
Dept: FAMILY MEDICINE | Facility: CLINIC | Age: 45
End: 2025-01-29
Payer: COMMERCIAL

## 2025-01-29 VITALS — BODY MASS INDEX: 44.1 KG/M2 | HEIGHT: 71 IN | WEIGHT: 315 LBS

## 2025-02-07 DIAGNOSIS — E66.01 MORBID OBESITY WITH BODY MASS INDEX (BMI) OF 40.0 TO 49.9 (H): ICD-10-CM

## 2025-02-10 RX ORDER — TIRZEPATIDE 5 MG/.5ML
5 INJECTION, SOLUTION SUBCUTANEOUS
Qty: 2 ML | Refills: 0 | Status: SHIPPED | OUTPATIENT
Start: 2025-02-10

## 2025-03-07 ENCOUNTER — MYC REFILL (OUTPATIENT)
Dept: FAMILY MEDICINE | Facility: CLINIC | Age: 45
End: 2025-03-07
Payer: COMMERCIAL

## 2025-03-07 DIAGNOSIS — E66.01 MORBID OBESITY WITH BODY MASS INDEX (BMI) OF 40.0 TO 49.9 (H): ICD-10-CM

## 2025-03-12 RX ORDER — TIRZEPATIDE 5 MG/.5ML
5 INJECTION, SOLUTION SUBCUTANEOUS
Qty: 2 ML | Refills: 0 | Status: SHIPPED | OUTPATIENT
Start: 2025-03-12

## 2025-06-30 ENCOUNTER — MYC REFILL (OUTPATIENT)
Dept: FAMILY MEDICINE | Facility: CLINIC | Age: 45
End: 2025-06-30
Payer: COMMERCIAL

## 2025-06-30 DIAGNOSIS — F33.42 RECURRENT MAJOR DEPRESSIVE DISORDER, IN FULL REMISSION: ICD-10-CM

## 2025-06-30 DIAGNOSIS — E66.01 MORBID OBESITY WITH BODY MASS INDEX (BMI) OF 40.0 TO 49.9 (H): ICD-10-CM

## 2025-07-01 NOTE — TELEPHONE ENCOUNTER
Clinic RN: Please investigate patient's chart or contact patient if the information cannot be found because patient should have run out of this medication on December 2025. Confirm patient is taking this medication as prescribed. Document findings and route refill encounter to provider for approval or denial.    Josefa Khan RN on 7/1/2025 at 9:38 AM

## 2025-07-01 NOTE — TELEPHONE ENCOUNTER
provider needs to review pharmacy/patient note. Provider, please approve or deny the prescription.    Kailey Quintanilla RN on 7/1/2025 at 9:01 AM

## 2025-07-03 RX ORDER — BUPROPION HYDROCHLORIDE 150 MG/1
150 TABLET ORAL EVERY MORNING
Qty: 90 TABLET | Refills: 3 | Status: SHIPPED | OUTPATIENT
Start: 2025-07-03

## 2025-08-04 SDOH — HEALTH STABILITY: PHYSICAL HEALTH: ON AVERAGE, HOW MANY MINUTES DO YOU ENGAGE IN EXERCISE AT THIS LEVEL?: 30 MIN

## 2025-08-04 SDOH — HEALTH STABILITY: PHYSICAL HEALTH: ON AVERAGE, HOW MANY DAYS PER WEEK DO YOU ENGAGE IN MODERATE TO STRENUOUS EXERCISE (LIKE A BRISK WALK)?: 2 DAYS

## 2025-08-04 ASSESSMENT — ANXIETY QUESTIONNAIRES
6. BECOMING EASILY ANNOYED OR IRRITABLE: NEARLY EVERY DAY
GAD7 TOTAL SCORE: 7
5. BEING SO RESTLESS THAT IT IS HARD TO SIT STILL: MORE THAN HALF THE DAYS
2. NOT BEING ABLE TO STOP OR CONTROL WORRYING: NOT AT ALL
GAD7 TOTAL SCORE: 7
3. WORRYING TOO MUCH ABOUT DIFFERENT THINGS: NOT AT ALL
7. FEELING AFRAID AS IF SOMETHING AWFUL MIGHT HAPPEN: NOT AT ALL
IF YOU CHECKED OFF ANY PROBLEMS ON THIS QUESTIONNAIRE, HOW DIFFICULT HAVE THESE PROBLEMS MADE IT FOR YOU TO DO YOUR WORK, TAKE CARE OF THINGS AT HOME, OR GET ALONG WITH OTHER PEOPLE: VERY DIFFICULT
GAD7 TOTAL SCORE: 7
8. IF YOU CHECKED OFF ANY PROBLEMS, HOW DIFFICULT HAVE THESE MADE IT FOR YOU TO DO YOUR WORK, TAKE CARE OF THINGS AT HOME, OR GET ALONG WITH OTHER PEOPLE?: VERY DIFFICULT
7. FEELING AFRAID AS IF SOMETHING AWFUL MIGHT HAPPEN: NOT AT ALL
4. TROUBLE RELAXING: MORE THAN HALF THE DAYS
1. FEELING NERVOUS, ANXIOUS, OR ON EDGE: NOT AT ALL

## 2025-08-04 ASSESSMENT — PATIENT HEALTH QUESTIONNAIRE - PHQ9
10. IF YOU CHECKED OFF ANY PROBLEMS, HOW DIFFICULT HAVE THESE PROBLEMS MADE IT FOR YOU TO DO YOUR WORK, TAKE CARE OF THINGS AT HOME, OR GET ALONG WITH OTHER PEOPLE: VERY DIFFICULT
SUM OF ALL RESPONSES TO PHQ QUESTIONS 1-9: 11
SUM OF ALL RESPONSES TO PHQ QUESTIONS 1-9: 11

## 2025-08-04 ASSESSMENT — SOCIAL DETERMINANTS OF HEALTH (SDOH): HOW OFTEN DO YOU GET TOGETHER WITH FRIENDS OR RELATIVES?: NEVER

## 2025-08-05 ENCOUNTER — OFFICE VISIT (OUTPATIENT)
Dept: FAMILY MEDICINE | Facility: CLINIC | Age: 45
End: 2025-08-05
Payer: COMMERCIAL

## 2025-08-05 VITALS
TEMPERATURE: 97.1 F | HEART RATE: 96 BPM | BODY MASS INDEX: 41.27 KG/M2 | SYSTOLIC BLOOD PRESSURE: 104 MMHG | HEIGHT: 70 IN | DIASTOLIC BLOOD PRESSURE: 80 MMHG | RESPIRATION RATE: 16 BRPM | OXYGEN SATURATION: 98 % | WEIGHT: 288.3 LBS

## 2025-08-05 DIAGNOSIS — Z00.00 ROUTINE GENERAL MEDICAL EXAMINATION AT A HEALTH CARE FACILITY: Primary | ICD-10-CM

## 2025-08-05 DIAGNOSIS — Z13.1 SCREENING FOR DIABETES MELLITUS: ICD-10-CM

## 2025-08-05 DIAGNOSIS — E66.01 MORBID OBESITY WITH BODY MASS INDEX (BMI) OF 40.0 TO 49.9 (H): ICD-10-CM

## 2025-08-05 DIAGNOSIS — G50.0 TRIGEMINAL NEURALGIA: ICD-10-CM

## 2025-08-05 DIAGNOSIS — F90.9 ATTENTION DEFICIT DISORDER OF ADULT WITH HYPERACTIVITY: ICD-10-CM

## 2025-08-05 DIAGNOSIS — K76.0 FATTY LIVER: ICD-10-CM

## 2025-08-05 DIAGNOSIS — E78.5 DYSLIPIDEMIA: ICD-10-CM

## 2025-08-05 DIAGNOSIS — Z13.6 SCREENING FOR CARDIOVASCULAR CONDITION: ICD-10-CM

## 2025-08-05 LAB
ALBUMIN SERPL BCG-MCNC: 4.4 G/DL (ref 3.5–5.2)
ALP SERPL-CCNC: 68 U/L (ref 40–150)
ALT SERPL W P-5'-P-CCNC: 35 U/L (ref 0–70)
AMPHETAMINES UR QL: NOT DETECTED
ANION GAP SERPL CALCULATED.3IONS-SCNC: 12 MMOL/L (ref 7–15)
AST SERPL W P-5'-P-CCNC: 23 U/L (ref 0–45)
BARBITURATES UR QL SCN: NOT DETECTED
BASOPHILS # BLD AUTO: 0.1 10E3/UL (ref 0–0.2)
BASOPHILS NFR BLD AUTO: 1 %
BENZODIAZ UR QL SCN: NOT DETECTED
BILIRUB SERPL-MCNC: 0.2 MG/DL
BUN SERPL-MCNC: 14.1 MG/DL (ref 6–20)
BUPRENORPHINE UR QL: NOT DETECTED
CALCIUM SERPL-MCNC: 10.1 MG/DL (ref 8.8–10.4)
CANNABINOIDS UR QL: NOT DETECTED
CHLORIDE SERPL-SCNC: 104 MMOL/L (ref 98–107)
CHOLEST SERPL-MCNC: 213 MG/DL
COCAINE UR QL SCN: NOT DETECTED
CREAT SERPL-MCNC: 0.87 MG/DL (ref 0.67–1.17)
D-METHAMPHET UR QL: NOT DETECTED
EGFRCR SERPLBLD CKD-EPI 2021: >90 ML/MIN/1.73M2
EOSINOPHIL # BLD AUTO: 0.6 10E3/UL (ref 0–0.7)
EOSINOPHIL NFR BLD AUTO: 6 %
ERYTHROCYTE [DISTWIDTH] IN BLOOD BY AUTOMATED COUNT: 13.1 % (ref 10–15)
EST. AVERAGE GLUCOSE BLD GHB EST-MCNC: 103 MG/DL
FASTING STATUS PATIENT QL REPORTED: ABNORMAL
FASTING STATUS PATIENT QL REPORTED: NORMAL
GLUCOSE SERPL-MCNC: 90 MG/DL (ref 70–99)
HBA1C MFR BLD: 5.2 % (ref 0–5.6)
HCO3 SERPL-SCNC: 23 MMOL/L (ref 22–29)
HCT VFR BLD AUTO: 44.4 % (ref 40–53)
HDLC SERPL-MCNC: 37 MG/DL
HGB BLD-MCNC: 15.1 G/DL (ref 13.3–17.7)
IMM GRANULOCYTES # BLD: 0 10E3/UL
IMM GRANULOCYTES NFR BLD: 0 %
LDLC SERPL CALC-MCNC: 150 MG/DL
LYMPHOCYTES # BLD AUTO: 2.4 10E3/UL (ref 0.8–5.3)
LYMPHOCYTES NFR BLD AUTO: 25 %
MCH RBC QN AUTO: 30.8 PG (ref 26.5–33)
MCHC RBC AUTO-ENTMCNC: 34 G/DL (ref 31.5–36.5)
MCV RBC AUTO: 90 FL (ref 78–100)
METHADONE UR QL SCN: NOT DETECTED
MONOCYTES # BLD AUTO: 0.6 10E3/UL (ref 0–1.3)
MONOCYTES NFR BLD AUTO: 7 %
NEUTROPHILS # BLD AUTO: 6 10E3/UL (ref 1.6–8.3)
NEUTROPHILS NFR BLD AUTO: 62 %
NONHDLC SERPL-MCNC: 176 MG/DL
OPIATES UR QL SCN: NOT DETECTED
OXYCODONE UR QL SCN: NOT DETECTED
PCP UR QL SCN: NOT DETECTED
PLATELET # BLD AUTO: 394 10E3/UL (ref 150–450)
POTASSIUM SERPL-SCNC: 4 MMOL/L (ref 3.4–5.3)
PROT SERPL-MCNC: 7.8 G/DL (ref 6.4–8.3)
RBC # BLD AUTO: 4.91 10E6/UL (ref 4.4–5.9)
SODIUM SERPL-SCNC: 139 MMOL/L (ref 135–145)
TRICYCLICS UR QL SCN: NOT DETECTED
TRIGL SERPL-MCNC: 130 MG/DL
WBC # BLD AUTO: 9.7 10E3/UL (ref 4–11)

## 2025-08-05 PROCEDURE — 90746 HEPB VACCINE 3 DOSE ADULT IM: CPT | Performed by: FAMILY MEDICINE

## 2025-08-05 PROCEDURE — 99214 OFFICE O/P EST MOD 30 MIN: CPT | Mod: 25 | Performed by: FAMILY MEDICINE

## 2025-08-05 PROCEDURE — 83036 HEMOGLOBIN GLYCOSYLATED A1C: CPT | Performed by: FAMILY MEDICINE

## 2025-08-05 PROCEDURE — 3050F LDL-C >= 130 MG/DL: CPT | Performed by: FAMILY MEDICINE

## 2025-08-05 PROCEDURE — 3074F SYST BP LT 130 MM HG: CPT | Performed by: FAMILY MEDICINE

## 2025-08-05 PROCEDURE — 3044F HG A1C LEVEL LT 7.0%: CPT | Performed by: FAMILY MEDICINE

## 2025-08-05 PROCEDURE — 90471 IMMUNIZATION ADMIN: CPT | Performed by: FAMILY MEDICINE

## 2025-08-05 PROCEDURE — 85025 COMPLETE CBC W/AUTO DIFF WBC: CPT | Mod: QW | Performed by: FAMILY MEDICINE

## 2025-08-05 PROCEDURE — 80053 COMPREHEN METABOLIC PANEL: CPT | Performed by: FAMILY MEDICINE

## 2025-08-05 PROCEDURE — 3079F DIAST BP 80-89 MM HG: CPT | Performed by: FAMILY MEDICINE

## 2025-08-05 PROCEDURE — 36415 COLL VENOUS BLD VENIPUNCTURE: CPT | Performed by: FAMILY MEDICINE

## 2025-08-05 PROCEDURE — 80306 DRUG TEST PRSMV INSTRMNT: CPT | Performed by: FAMILY MEDICINE

## 2025-08-05 PROCEDURE — 99396 PREV VISIT EST AGE 40-64: CPT | Mod: 25 | Performed by: FAMILY MEDICINE

## 2025-08-05 PROCEDURE — 80061 LIPID PANEL: CPT | Performed by: FAMILY MEDICINE

## 2025-08-05 RX ORDER — METHYLPHENIDATE HYDROCHLORIDE 10 MG/1
10-20 TABLET ORAL 2 TIMES DAILY
Qty: 100 TABLET | Refills: 0 | Status: SHIPPED | OUTPATIENT
Start: 2025-08-05

## 2025-08-05 RX ORDER — GABAPENTIN 100 MG/1
100 CAPSULE ORAL 3 TIMES DAILY
Qty: 270 CAPSULE | Refills: 1 | Status: SHIPPED | OUTPATIENT
Start: 2025-08-05

## 2025-08-07 ENCOUNTER — MYC MEDICAL ADVICE (OUTPATIENT)
Dept: FAMILY MEDICINE | Facility: CLINIC | Age: 45
End: 2025-08-07
Payer: COMMERCIAL

## 2025-08-07 DIAGNOSIS — E66.01 MORBID OBESITY WITH BODY MASS INDEX (BMI) OF 40.0 TO 49.9 (H): Primary | ICD-10-CM

## 2025-08-07 DIAGNOSIS — E78.6 LOW HDL (UNDER 40): ICD-10-CM

## 2025-08-07 DIAGNOSIS — E78.00 ELEVATED LDL CHOLESTEROL LEVEL: ICD-10-CM

## 2025-08-25 ENCOUNTER — VIRTUAL VISIT (OUTPATIENT)
Dept: FAMILY MEDICINE | Facility: CLINIC | Age: 45
End: 2025-08-25
Payer: COMMERCIAL

## 2025-08-25 DIAGNOSIS — G25.81 RESTLESS LEG SYNDROME: Primary | ICD-10-CM

## 2025-08-25 DIAGNOSIS — F90.9 ATTENTION DEFICIT DISORDER OF ADULT WITH HYPERACTIVITY: ICD-10-CM

## 2025-08-25 PROCEDURE — 98006 SYNCH AUDIO-VIDEO EST MOD 30: CPT | Performed by: FAMILY MEDICINE

## 2025-08-25 RX ORDER — ROPINIROLE 0.25 MG/1
TABLET, FILM COATED ORAL
Qty: 96 TABLET | Refills: 1 | Status: SHIPPED | OUTPATIENT
Start: 2025-08-25 | End: 2025-09-22

## 2025-08-30 ENCOUNTER — MYC MEDICAL ADVICE (OUTPATIENT)
Dept: FAMILY MEDICINE | Facility: CLINIC | Age: 45
End: 2025-08-30
Payer: COMMERCIAL

## 2025-08-30 DIAGNOSIS — E78.6 LOW HDL (UNDER 40): ICD-10-CM

## 2025-08-30 DIAGNOSIS — E66.01 MORBID OBESITY WITH BODY MASS INDEX (BMI) OF 40.0 TO 49.9 (H): ICD-10-CM

## 2025-08-30 DIAGNOSIS — F90.9 ATTENTION DEFICIT DISORDER OF ADULT WITH HYPERACTIVITY: Primary | ICD-10-CM

## 2025-08-30 DIAGNOSIS — E78.00 ELEVATED LDL CHOLESTEROL LEVEL: ICD-10-CM

## 2025-09-02 RX ORDER — METHYLPHENIDATE HYDROCHLORIDE 20 MG/1
20 TABLET ORAL 2 TIMES DAILY
Qty: 60 TABLET | Refills: 0 | Status: SHIPPED | OUTPATIENT
Start: 2025-09-30

## 2025-09-02 RX ORDER — SEMAGLUTIDE 0.25 MG/.5ML
INJECTION, SOLUTION SUBCUTANEOUS
Qty: 4 ML | Refills: 0 | OUTPATIENT
Start: 2025-09-02

## 2025-09-02 RX ORDER — METHYLPHENIDATE HYDROCHLORIDE 20 MG/1
20 TABLET ORAL 2 TIMES DAILY
Qty: 60 TABLET | Refills: 0 | Status: SHIPPED | OUTPATIENT
Start: 2025-10-28

## 2025-09-02 RX ORDER — METHYLPHENIDATE HYDROCHLORIDE 20 MG/1
20 TABLET ORAL 2 TIMES DAILY
Qty: 60 TABLET | Refills: 0 | Status: SHIPPED | OUTPATIENT
Start: 2025-09-02